# Patient Record
Sex: FEMALE | Race: WHITE | NOT HISPANIC OR LATINO | URBAN - METROPOLITAN AREA
[De-identification: names, ages, dates, MRNs, and addresses within clinical notes are randomized per-mention and may not be internally consistent; named-entity substitution may affect disease eponyms.]

---

## 2017-01-09 ENCOUNTER — OUTPATIENT (OUTPATIENT)
Dept: OUTPATIENT SERVICES | Facility: HOSPITAL | Age: 72
LOS: 1 days | End: 2017-01-09
Payer: MEDICARE

## 2017-01-09 DIAGNOSIS — Z51.89 ENCOUNTER FOR OTHER SPECIFIED AFTERCARE: ICD-10-CM

## 2017-01-09 DIAGNOSIS — M48.06 SPINAL STENOSIS, LUMBAR REGION: ICD-10-CM

## 2017-03-29 PROCEDURE — G8980: CPT | Mod: CK

## 2017-03-29 PROCEDURE — G8979: CPT | Mod: CK

## 2017-03-29 PROCEDURE — 97010 HOT OR COLD PACKS THERAPY: CPT

## 2017-03-29 PROCEDURE — 97140 MANUAL THERAPY 1/> REGIONS: CPT | Mod: KX

## 2017-03-29 PROCEDURE — 97163 PT EVAL HIGH COMPLEX 45 MIN: CPT

## 2017-03-29 PROCEDURE — G8978: CPT | Mod: CK

## 2017-03-29 PROCEDURE — 97110 THERAPEUTIC EXERCISES: CPT | Mod: KX

## 2017-04-02 ENCOUNTER — TRANSCRIPTION ENCOUNTER (OUTPATIENT)
Age: 72
End: 2017-04-02

## 2017-04-07 ENCOUNTER — INPATIENT (INPATIENT)
Facility: HOSPITAL | Age: 72
LOS: 5 days | Discharge: HOME | DRG: 560 | End: 2017-04-13
Attending: PHYSICAL MEDICINE & REHABILITATION | Admitting: SPECIALIST
Payer: MEDICARE

## 2017-04-07 VITALS
HEART RATE: 76 BPM | OXYGEN SATURATION: 97 % | TEMPERATURE: 99 F | WEIGHT: 212.53 LBS | DIASTOLIC BLOOD PRESSURE: 65 MMHG | RESPIRATION RATE: 18 BRPM | SYSTOLIC BLOOD PRESSURE: 144 MMHG

## 2017-04-07 DIAGNOSIS — Z51.89 ENCOUNTER FOR OTHER SPECIFIED AFTERCARE: ICD-10-CM

## 2017-04-07 DIAGNOSIS — M48.06 SPINAL STENOSIS, LUMBAR REGION: ICD-10-CM

## 2017-04-07 PROCEDURE — 99222 1ST HOSP IP/OBS MODERATE 55: CPT | Mod: AI,GC

## 2017-04-07 RX ORDER — SERTRALINE 25 MG/1
25 TABLET, FILM COATED ORAL DAILY
Qty: 0 | Refills: 0 | Status: DISCONTINUED | OUTPATIENT
Start: 2017-04-07 | End: 2017-04-13

## 2017-04-07 RX ORDER — ACETAMINOPHEN 500 MG
650 TABLET ORAL EVERY 6 HOURS
Qty: 0 | Refills: 0 | Status: DISCONTINUED | OUTPATIENT
Start: 2017-04-07 | End: 2017-04-13

## 2017-04-07 RX ORDER — FLUTICASONE PROPIONATE 50 MCG
2 SPRAY, SUSPENSION NASAL
Qty: 0 | Refills: 0 | Status: DISCONTINUED | OUTPATIENT
Start: 2017-04-07 | End: 2017-04-13

## 2017-04-07 RX ORDER — ALLOPURINOL 300 MG
100 TABLET ORAL DAILY
Qty: 0 | Refills: 0 | Status: DISCONTINUED | OUTPATIENT
Start: 2017-04-07 | End: 2017-04-13

## 2017-04-07 RX ORDER — MAGNESIUM OXIDE 400 MG ORAL TABLET 241.3 MG
400 TABLET ORAL DAILY
Qty: 0 | Refills: 0 | Status: DISCONTINUED | OUTPATIENT
Start: 2017-04-07 | End: 2017-04-13

## 2017-04-07 RX ORDER — FUROSEMIDE 40 MG
20 TABLET ORAL DAILY
Qty: 0 | Refills: 0 | Status: DISCONTINUED | OUTPATIENT
Start: 2017-04-07 | End: 2017-04-13

## 2017-04-07 RX ORDER — ASCORBIC ACID 60 MG
500 TABLET,CHEWABLE ORAL DAILY
Qty: 0 | Refills: 0 | Status: DISCONTINUED | OUTPATIENT
Start: 2017-04-07 | End: 2017-04-13

## 2017-04-07 RX ORDER — TAPENTADOL HYDROCHLORIDE 50 MG/1
100 TABLET, FILM COATED ORAL EVERY 4 HOURS
Qty: 0 | Refills: 0 | Status: DISCONTINUED | OUTPATIENT
Start: 2017-04-07 | End: 2017-04-13

## 2017-04-07 RX ORDER — ASPIRIN/CALCIUM CARB/MAGNESIUM 324 MG
325 TABLET ORAL
Qty: 0 | Refills: 0 | Status: DISCONTINUED | OUTPATIENT
Start: 2017-04-07 | End: 2017-04-13

## 2017-04-07 RX ORDER — SENNA PLUS 8.6 MG/1
2 TABLET ORAL AT BEDTIME
Qty: 0 | Refills: 0 | Status: DISCONTINUED | OUTPATIENT
Start: 2017-04-07 | End: 2017-04-13

## 2017-04-07 RX ORDER — ATORVASTATIN CALCIUM 80 MG/1
10 TABLET, FILM COATED ORAL AT BEDTIME
Qty: 0 | Refills: 0 | Status: DISCONTINUED | OUTPATIENT
Start: 2017-04-07 | End: 2017-04-13

## 2017-04-07 RX ORDER — POTASSIUM CHLORIDE 20 MEQ
10 PACKET (EA) ORAL DAILY
Qty: 0 | Refills: 0 | Status: DISCONTINUED | OUTPATIENT
Start: 2017-04-07 | End: 2017-04-13

## 2017-04-07 RX ORDER — DOCUSATE SODIUM 100 MG
100 CAPSULE ORAL
Qty: 0 | Refills: 0 | Status: DISCONTINUED | OUTPATIENT
Start: 2017-04-07 | End: 2017-04-13

## 2017-04-07 RX ORDER — FERROUS SULFATE 325(65) MG
325 TABLET ORAL
Qty: 0 | Refills: 0 | Status: DISCONTINUED | OUTPATIENT
Start: 2017-04-07 | End: 2017-04-10

## 2017-04-07 RX ORDER — POTASSIUM CHLORIDE 20 MEQ
30 PACKET (EA) ORAL DAILY
Qty: 0 | Refills: 0 | Status: DISCONTINUED | OUTPATIENT
Start: 2017-04-07 | End: 2017-04-07

## 2017-04-07 RX ADMIN — Medication 325 MILLIGRAM(S): at 18:15

## 2017-04-07 RX ADMIN — Medication 100 MILLIGRAM(S): at 18:15

## 2017-04-07 RX ADMIN — Medication 325 MILLIGRAM(S): at 18:14

## 2017-04-07 RX ADMIN — Medication 50 MILLIGRAM(S): at 18:15

## 2017-04-07 RX ADMIN — Medication 2 SPRAY(S): at 22:13

## 2017-04-08 LAB
24R-OH-CALCIDIOL SERPL-MCNC: 34.9 NG/ML — SIGNIFICANT CHANGE UP (ref 30–100)
ANION GAP SERPL CALC-SCNC: 11 MMOL/L — SIGNIFICANT CHANGE UP (ref 5–17)
APPEARANCE UR: ABNORMAL
BACTERIA # UR AUTO: ABNORMAL
BASOPHILS # BLD AUTO: 0 K/UL — SIGNIFICANT CHANGE UP (ref 0–0.2)
BASOPHILS NFR BLD AUTO: 0.3 % — SIGNIFICANT CHANGE UP (ref 0–2)
BILIRUB UR-MCNC: NEGATIVE — SIGNIFICANT CHANGE UP
BUN SERPL-MCNC: 12 MG/DL — SIGNIFICANT CHANGE UP (ref 8–20)
CALCIUM SERPL-MCNC: 8.9 MG/DL — SIGNIFICANT CHANGE UP (ref 8.6–10.2)
CHLORIDE SERPL-SCNC: 100 MMOL/L — SIGNIFICANT CHANGE UP (ref 98–107)
CO2 SERPL-SCNC: 29 MMOL/L — SIGNIFICANT CHANGE UP (ref 22–29)
COLOR SPEC: YELLOW — SIGNIFICANT CHANGE UP
CREAT SERPL-MCNC: 0.44 MG/DL — LOW (ref 0.5–1.3)
DIFF PNL FLD: ABNORMAL
EOSINOPHIL # BLD AUTO: 0.3 K/UL — SIGNIFICANT CHANGE UP (ref 0–0.5)
EOSINOPHIL NFR BLD AUTO: 4.5 % — SIGNIFICANT CHANGE UP (ref 0–6)
EPI CELLS # UR: SIGNIFICANT CHANGE UP
GLUCOSE SERPL-MCNC: 95 MG/DL — SIGNIFICANT CHANGE UP (ref 70–115)
GLUCOSE UR QL: NEGATIVE MG/DL — SIGNIFICANT CHANGE UP
HCT VFR BLD CALC: 31.5 % — LOW (ref 37–47)
HGB BLD-MCNC: 10.4 G/DL — LOW (ref 12–16)
KETONES UR-MCNC: NEGATIVE — SIGNIFICANT CHANGE UP
LEUKOCYTE ESTERASE UR-ACNC: ABNORMAL
LYMPHOCYTES # BLD AUTO: 2.5 K/UL — SIGNIFICANT CHANGE UP (ref 1–4.8)
LYMPHOCYTES # BLD AUTO: 34.4 % — SIGNIFICANT CHANGE UP (ref 20–55)
MAGNESIUM SERPL-MCNC: 1.9 MG/DL — SIGNIFICANT CHANGE UP (ref 1.8–2.5)
MCHC RBC-ENTMCNC: 30.1 PG — SIGNIFICANT CHANGE UP (ref 27–31)
MCHC RBC-ENTMCNC: 33 G/DL — SIGNIFICANT CHANGE UP (ref 32–36)
MCV RBC AUTO: 91.3 FL — SIGNIFICANT CHANGE UP (ref 81–99)
MONOCYTES # BLD AUTO: 0.9 K/UL — HIGH (ref 0–0.8)
MONOCYTES NFR BLD AUTO: 13.1 % — HIGH (ref 3–10)
NEUTROPHILS # BLD AUTO: 3.4 K/UL — SIGNIFICANT CHANGE UP (ref 1.8–8)
NEUTROPHILS NFR BLD AUTO: 47.6 % — SIGNIFICANT CHANGE UP (ref 37–73)
NITRITE UR-MCNC: NEGATIVE — SIGNIFICANT CHANGE UP
PH UR: 6 — SIGNIFICANT CHANGE UP (ref 4.8–8)
PLATELET # BLD AUTO: 238 K/UL — SIGNIFICANT CHANGE UP (ref 150–400)
POTASSIUM SERPL-MCNC: 3.7 MMOL/L — SIGNIFICANT CHANGE UP (ref 3.5–5.3)
POTASSIUM SERPL-SCNC: 3.7 MMOL/L — SIGNIFICANT CHANGE UP (ref 3.5–5.3)
PROT UR-MCNC: NEGATIVE MG/DL — SIGNIFICANT CHANGE UP
RBC # BLD: 3.45 M/UL — LOW (ref 4.4–5.2)
RBC # FLD: 13.1 % — SIGNIFICANT CHANGE UP (ref 11–15.6)
RBC CASTS # UR COMP ASSIST: ABNORMAL /HPF (ref 0–4)
SODIUM SERPL-SCNC: 140 MMOL/L — SIGNIFICANT CHANGE UP (ref 135–145)
SP GR SPEC: 1.01 — SIGNIFICANT CHANGE UP (ref 1.01–1.02)
UROBILINOGEN FLD QL: NEGATIVE MG/DL — SIGNIFICANT CHANGE UP
WBC # BLD: 7.2 K/UL — SIGNIFICANT CHANGE UP (ref 4.8–10.8)
WBC # FLD AUTO: 7.2 K/UL — SIGNIFICANT CHANGE UP (ref 4.8–10.8)
WBC UR QL: ABNORMAL

## 2017-04-08 PROCEDURE — 99232 SBSQ HOSP IP/OBS MODERATE 35: CPT

## 2017-04-08 RX ORDER — LACTOBACILLUS ACIDOPHILUS 100MM CELL
1 CAPSULE ORAL
Qty: 0 | Refills: 0 | Status: DISCONTINUED | OUTPATIENT
Start: 2017-04-08 | End: 2017-04-13

## 2017-04-08 RX ORDER — CIPROFLOXACIN LACTATE 400MG/40ML
250 VIAL (ML) INTRAVENOUS EVERY 12 HOURS
Qty: 0 | Refills: 0 | Status: COMPLETED | OUTPATIENT
Start: 2017-04-08 | End: 2017-04-12

## 2017-04-08 RX ORDER — CIPROFLOXACIN LACTATE 400MG/40ML
250 VIAL (ML) INTRAVENOUS ONCE
Qty: 0 | Refills: 0 | Status: COMPLETED | OUTPATIENT
Start: 2017-04-08 | End: 2017-04-08

## 2017-04-08 RX ORDER — CIPROFLOXACIN LACTATE 400MG/40ML
250 VIAL (ML) INTRAVENOUS EVERY 12 HOURS
Qty: 0 | Refills: 0 | Status: DISCONTINUED | OUTPATIENT
Start: 2017-04-08 | End: 2017-04-08

## 2017-04-08 RX ADMIN — SERTRALINE 25 MILLIGRAM(S): 25 TABLET, FILM COATED ORAL at 12:00

## 2017-04-08 RX ADMIN — Medication 100 MILLIGRAM(S): at 06:16

## 2017-04-08 RX ADMIN — Medication 250 MILLIGRAM(S): at 06:16

## 2017-04-08 RX ADMIN — Medication 1 TABLET(S): at 11:59

## 2017-04-08 RX ADMIN — Medication 250 MILLIGRAM(S): at 17:30

## 2017-04-08 RX ADMIN — Medication 325 MILLIGRAM(S): at 17:30

## 2017-04-08 RX ADMIN — Medication 10 MILLIEQUIVALENT(S): at 11:59

## 2017-04-08 RX ADMIN — Medication 2 SPRAY(S): at 06:17

## 2017-04-08 RX ADMIN — Medication 50 MILLIGRAM(S): at 06:17

## 2017-04-08 RX ADMIN — Medication 20 MILLIGRAM(S): at 06:17

## 2017-04-08 RX ADMIN — Medication 325 MILLIGRAM(S): at 08:58

## 2017-04-08 RX ADMIN — Medication 1 TABLET(S): at 17:30

## 2017-04-08 RX ADMIN — MAGNESIUM OXIDE 400 MG ORAL TABLET 400 MILLIGRAM(S): 241.3 TABLET ORAL at 11:59

## 2017-04-08 RX ADMIN — TAPENTADOL HYDROCHLORIDE 100 MILLIGRAM(S): 50 TABLET, FILM COATED ORAL at 22:57

## 2017-04-08 RX ADMIN — Medication 650 MILLIGRAM(S): at 14:24

## 2017-04-08 RX ADMIN — TAPENTADOL HYDROCHLORIDE 100 MILLIGRAM(S): 50 TABLET, FILM COATED ORAL at 08:58

## 2017-04-08 RX ADMIN — Medication 500 MILLIGRAM(S): at 11:59

## 2017-04-08 RX ADMIN — TAPENTADOL HYDROCHLORIDE 100 MILLIGRAM(S): 50 TABLET, FILM COATED ORAL at 16:28

## 2017-04-08 RX ADMIN — Medication 650 MILLIGRAM(S): at 13:47

## 2017-04-08 RX ADMIN — Medication 325 MILLIGRAM(S): at 11:59

## 2017-04-08 RX ADMIN — Medication 100 MILLIGRAM(S): at 12:00

## 2017-04-08 RX ADMIN — Medication 325 MILLIGRAM(S): at 06:16

## 2017-04-08 RX ADMIN — Medication 50 MILLIGRAM(S): at 17:32

## 2017-04-08 RX ADMIN — TAPENTADOL HYDROCHLORIDE 100 MILLIGRAM(S): 50 TABLET, FILM COATED ORAL at 00:54

## 2017-04-08 NOTE — PROGRESS NOTE ADULT - SUBJECTIVE AND OBJECTIVE BOX
No overnight events. Slept ok, but was interrupted a few times.   Pain in the left hip is controlled with medication. Swelling is stable.  Had BM. No other issues reported.         REVIEW OF SYSTEMS  Constitutional - No fever, No weight loss, No fatigue  HEENT - No eye pain, No visual disturbances, No difficulty hearing, No tinnitus, No vertigo, No neck pain  Neurological - No headaches, No memory loss, +loss of strength, No numbness, No tremors  Skin - No itching, No rashes, No lesions   Musculoskeletal - +o joint pain, +joint swelling, +muscle pain  Psychiatric - No depression, No anxiety    RECENT LABS/IMAGING  CBC Full  -  ( 2017 05:07 )  WBC Count : 7.2 K/uL  Hemoglobin : 10.4 g/dL  Hematocrit : 31.5 %  Platelet Count - Automated : 238 K/uL  Mean Cell Volume : 91.3 fl  Mean Cell Hemoglobin : 30.1 pg  Mean Cell Hemoglobin Concentration : 33.0 g/dL  Auto Neutrophil # : 3.4 K/uL  Auto Lymphocyte # : 2.5 K/uL  Auto Monocyte # : 0.9 K/uL  Auto Eosinophil # : 0.3 K/uL  Auto Basophil # : 0.0 K/uL  Auto Neutrophil % : 47.6 %  Auto Lymphocyte % : 34.4 %  Auto Monocyte % : 13.1 %  Auto Eosinophil % : 4.5 %  Auto Basophil % : 0.3 %    08    140  |  100  |  12.0  ----------------------------<  95  3.7   |  29.0  |  0.44<L>    Ca    8.9      2017 05:07  Mg     1.9     -08      Urinalysis Basic - ( 2017 01:40 )    Color: Yellow / Appearance: very cloudy / S.010 / pH: x  Gluc: x / Ketone: Negative  / Bili: Negative / Urobili: Negative mg/dL   Blood: x / Protein: Negative mg/dL / Nitrite: Negative   Leuk Esterase: Moderate / RBC: 3-5 /HPF / WBC 11-25   Sq Epi: x / Non Sq Epi: Occasional / Bacteria: Moderate        VITALS  T(C): 36.9, Max: 37.1 (07 @ 17:31)  HR: 70 (70 - 77)  BP: 138/70 (119/67 - 144/65)  RR: 18 (18 - 18)  SpO2: 95% (95% - 97%)  Wt(kg): --    MEDICATIONS   allopurinol 100milliGRAM(s) daily  ascorbic acid 500milliGRAM(s) daily  aspirin 325milliGRAM(s) two times a day  atorvastatin 10milliGRAM(s) at bedtime  docusate sodium 100milliGRAM(s) two times a day  ferrous    sulfate 325milliGRAM(s) three times a day with meals  fluticasone propionate 50 MICROgram(s)/spray Nasal Spray 2Spray(s) two times a day  furosemide    Tablet 20milliGRAM(s) daily  pregabalin 50milliGRAM(s) two times a day  sertraline 25milliGRAM(s) daily  magnesium oxide 400milliGRAM(s) daily  acetaminophen   Tablet 650milliGRAM(s) every 6 hours PRN  multivitamin 1Tablet(s) daily  acetaminophen   Tablet. 650milliGRAM(s) every 6 hours PRN  potassium chloride    Tablet ER 10milliEquivalent(s) daily  senna 2Tablet(s) at bedtime  tapentadol 100milliGRAM(s) every 6 hours PRN      --------------------------------------------------------------------  PHYSICAL EXAM  Constitutional - NAD, Comfortable  Chest - CTA bilaterally, No wheeze, No rhonchi, No crackles  Cardiovascular - RRR, S1S2, No murmurs  Abdomen - BS+, Soft, NTND  Extremities - No C/C/E, No calf tenderness   Neurologic Exam -                    Cognitive - Awake, Alert, AAO to self, place, date, year, situation     Communication - Fluent, No dysarthria     Motor - No focal deficits                    LEFT    UE - ShAB 5/5, EF 5/5, EE 5/5, WE 5/5,  5/5                    RIGHT UE - ShAB 5/5, EF 5/5, EE 5/5, WE 5/5,  5/5                    LEFT    LE - HF -/5, KE -/5, DF 5/5, PF 5/5                    RIGHT LE - HF 5/5, KE 5/5, DF 5/5, PF 5/5        Sensory - Intact to LT   Psychiatric - Mood stable, Affect WNL  ----------------------------------------------------------------------------------------  ASSESSMENT/PLAN  71F s/p left MARIANO now with functional deficits  HTN - Lasix + KCl  Mood - Zoloft  ENT - Flonase  CAD - Lipitor  Gout - Allopurinol  GI/Bowel - Colace, Senna  Pain - Tylenol PRN, Lyrica, Nucynta  DVT PPX - ASA  Diet - Regular/Thins, Vit C, Iron, MgOx, MVI    Continue comprehensive program of 3hrs/day consisting of OT, PT.

## 2017-04-08 NOTE — PHARMACY COMMUNICATION NOTE - COMMENTS
Spoke with MD regarding no SrCr so can't calculate proper dosing.  MD said there was a lab value from 4/6/17 from a different hospital of 0.63.  Going to give one dose now and suspend until BMP comes back in the morning.

## 2017-04-09 PROCEDURE — 99232 SBSQ HOSP IP/OBS MODERATE 35: CPT

## 2017-04-09 RX ADMIN — Medication 325 MILLIGRAM(S): at 17:35

## 2017-04-09 RX ADMIN — Medication 325 MILLIGRAM(S): at 12:05

## 2017-04-09 RX ADMIN — TAPENTADOL HYDROCHLORIDE 100 MILLIGRAM(S): 50 TABLET, FILM COATED ORAL at 19:27

## 2017-04-09 RX ADMIN — TAPENTADOL HYDROCHLORIDE 100 MILLIGRAM(S): 50 TABLET, FILM COATED ORAL at 06:54

## 2017-04-09 RX ADMIN — Medication 100 MILLIGRAM(S): at 17:36

## 2017-04-09 RX ADMIN — SERTRALINE 25 MILLIGRAM(S): 25 TABLET, FILM COATED ORAL at 12:05

## 2017-04-09 RX ADMIN — Medication 1 TABLET(S): at 17:39

## 2017-04-09 RX ADMIN — Medication 10 MILLIEQUIVALENT(S): at 12:05

## 2017-04-09 RX ADMIN — Medication 325 MILLIGRAM(S): at 17:36

## 2017-04-09 RX ADMIN — Medication 100 MILLIGRAM(S): at 06:03

## 2017-04-09 RX ADMIN — Medication 1 TABLET(S): at 12:05

## 2017-04-09 RX ADMIN — Medication 250 MILLIGRAM(S): at 06:03

## 2017-04-09 RX ADMIN — Medication 650 MILLIGRAM(S): at 07:00

## 2017-04-09 RX ADMIN — Medication 20 MILLIGRAM(S): at 06:03

## 2017-04-09 RX ADMIN — Medication 325 MILLIGRAM(S): at 08:29

## 2017-04-09 RX ADMIN — TAPENTADOL HYDROCHLORIDE 100 MILLIGRAM(S): 50 TABLET, FILM COATED ORAL at 08:31

## 2017-04-09 RX ADMIN — TAPENTADOL HYDROCHLORIDE 100 MILLIGRAM(S): 50 TABLET, FILM COATED ORAL at 16:00

## 2017-04-09 RX ADMIN — Medication 250 MILLIGRAM(S): at 17:35

## 2017-04-09 RX ADMIN — Medication 500 MILLIGRAM(S): at 12:04

## 2017-04-09 RX ADMIN — Medication 50 MILLIGRAM(S): at 17:35

## 2017-04-09 RX ADMIN — MAGNESIUM OXIDE 400 MG ORAL TABLET 400 MILLIGRAM(S): 241.3 TABLET ORAL at 12:04

## 2017-04-09 RX ADMIN — Medication 650 MILLIGRAM(S): at 14:36

## 2017-04-09 RX ADMIN — TAPENTADOL HYDROCHLORIDE 100 MILLIGRAM(S): 50 TABLET, FILM COATED ORAL at 22:00

## 2017-04-09 RX ADMIN — Medication 100 MILLIGRAM(S): at 12:04

## 2017-04-09 RX ADMIN — Medication 650 MILLIGRAM(S): at 06:03

## 2017-04-09 RX ADMIN — Medication 50 MILLIGRAM(S): at 06:03

## 2017-04-09 RX ADMIN — Medication 325 MILLIGRAM(S): at 06:03

## 2017-04-09 RX ADMIN — TAPENTADOL HYDROCHLORIDE 100 MILLIGRAM(S): 50 TABLET, FILM COATED ORAL at 15:03

## 2017-04-09 RX ADMIN — Medication 650 MILLIGRAM(S): at 15:00

## 2017-04-09 RX ADMIN — Medication 1 TABLET(S): at 09:33

## 2017-04-09 NOTE — PROGRESS NOTE ADULT - SUBJECTIVE AND OBJECTIVE BOX
Patient had therapy and walked a lot. She feels her left leg is doing well in terms of pain management. Also requesting her Nucynta to be given every 4 hrs. Will change. Had BM yesterday.     REVIEW OF SYSTEMS  Constitutional - No fever, No weight loss, No fatigue  HEENT - No eye pain, No visual disturbances, No difficulty hearing, No tinnitus, No vertigo, No neck pain  Neurological - No headaches, No memory loss, +loss of strength, No numbness, No tremors  Skin - No itching, No rashes, No lesions   Musculoskeletal - +joint pain, +joint swelling, +muscle pain  Psychiatric - No depression, No anxiety    RECENT LABS/IMAGING  CBC Full  -  ( 2017 05:07 )  WBC Count : 7.2 K/uL  Hemoglobin : 10.4 g/dL  Hematocrit : 31.5 %  Platelet Count - Automated : 238 K/uL  Mean Cell Volume : 91.3 fl  Mean Cell Hemoglobin : 30.1 pg  Mean Cell Hemoglobin Concentration : 33.0 g/dL  Auto Neutrophil # : 3.4 K/uL  Auto Lymphocyte # : 2.5 K/uL  Auto Monocyte # : 0.9 K/uL  Auto Eosinophil # : 0.3 K/uL  Auto Basophil # : 0.0 K/uL  Auto Neutrophil % : 47.6 %  Auto Lymphocyte % : 34.4 %  Auto Monocyte % : 13.1 %  Auto Eosinophil % : 4.5 %  Auto Basophil % : 0.3 %    08    140  |  100  |  12.0  ----------------------------<  95  3.7   |  29.0  |  0.44<L>    Ca    8.9      2017 05:07  Mg     1.9     04-08      Urinalysis Basic - ( 2017 01:40 )    Color: Yellow / Appearance: very cloudy / S.010 / pH: x  Gluc: x / Ketone: Negative  / Bili: Negative / Urobili: Negative mg/dL   Blood: x / Protein: Negative mg/dL / Nitrite: Negative   Leuk Esterase: Moderate / RBC: 3-5 /HPF / WBC 11-25   Sq Epi: x / Non Sq Epi: Occasional / Bacteria: Moderate        VITALS  T(C): 36.9  T(F): 98.5, Max: 98.5 (04-09 @ 05:54)  HR: 75 (68 - 76)  BP: 160/80 (130/60 - 160/80)  RR:  (16 - 16)  SpO2:  (94% - 98%)  Wt(kg): --    MEDICATIONS   allopurinol 100milliGRAM(s) daily  ascorbic acid 500milliGRAM(s) daily  aspirin 325milliGRAM(s) two times a day  atorvastatin 10milliGRAM(s) at bedtime  docusate sodium 100milliGRAM(s) two times a day  ferrous    sulfate 325milliGRAM(s) three times a day with meals  fluticasone propionate 50 MICROgram(s)/spray Nasal Spray 2Spray(s) two times a day  furosemide    Tablet 20milliGRAM(s) daily  pregabalin 50milliGRAM(s) two times a day  sertraline 25milliGRAM(s) daily  magnesium oxide 400milliGRAM(s) daily  acetaminophen   Tablet 650milliGRAM(s) every 6 hours PRN  multivitamin 1Tablet(s) daily  acetaminophen   Tablet. 650milliGRAM(s) every 6 hours PRN  potassium chloride    Tablet ER 10milliEquivalent(s) daily  senna 2Tablet(s) at bedtime  tapentadol 100milliGRAM(s) every 6 hours PRN      --------------------------------------------------------------------  PHYSICAL EXAM  Constitutional - NAD, Comfortable  Chest - CTA bilaterally, No wheeze, No rhonchi, No crackles  Cardiovascular - RRR, S1S2, No murmurs  Abdomen - BS+, Soft, NTND  Extremities - No C/C/E, No calf tenderness   Neurologic Exam -                    Cognitive - Awake, Alert, AAO to self, place, date, year, situation     Communication - Fluent, No dysarthria     Motor - No focal deficits                    LEFT    UE - ShAB 5/5, EF 5/5, EE 5/5, WE 5/5,  5/5                    RIGHT UE - ShAB 5/5, EF 5/5, EE 5/5, WE 5/5,  5/5                    LEFT    LE - HF -/5, KE -/5, DF 5/5, PF 5/5                    RIGHT LE - HF 5/5, KE 5/5, DF 5/5, PF 5/        Sensory - Intact to LT   Psychiatric - Mood stable, Affect WNL  ----------------------------------------------------------------------------------------  ASSESSMENT/PLAN  71F s/p left MARIANO now with functional deficits  HTN - Lasix + KCl  Mood - Zoloft  ENT - Flonase  CAD - Lipitor  Gout - Allopurinol  GI/Bowel - Colace, Senna  UTI - Cipro & Florastor x5 days (-)  Pain - Tylenol PRN, Lyrica, Nucynta PRN  DVT PPX - ASA  Diet - Regular/Thins, Vit C, Iron, MgOx, MVI    Continue comprehensive program of 3hrs/day consisting of OT & PT.

## 2017-04-10 LAB
-  AMIKACIN: SIGNIFICANT CHANGE UP
-  AMPICILLIN/SULBACTAM: SIGNIFICANT CHANGE UP
-  AMPICILLIN: SIGNIFICANT CHANGE UP
-  AZTREONAM: SIGNIFICANT CHANGE UP
-  CEFAZOLIN: SIGNIFICANT CHANGE UP
-  CEFEPIME: SIGNIFICANT CHANGE UP
-  CEFOXITIN: SIGNIFICANT CHANGE UP
-  CEFTAZIDIME: SIGNIFICANT CHANGE UP
-  CEFTRIAXONE: SIGNIFICANT CHANGE UP
-  CIPROFLOXACIN: SIGNIFICANT CHANGE UP
-  ERTAPENEM: SIGNIFICANT CHANGE UP
-  GENTAMICIN: SIGNIFICANT CHANGE UP
-  IMIPENEM: SIGNIFICANT CHANGE UP
-  LEVOFLOXACIN: SIGNIFICANT CHANGE UP
-  MEROPENEM: SIGNIFICANT CHANGE UP
-  NITROFURANTOIN: SIGNIFICANT CHANGE UP
-  PIPERACILLIN/TAZOBACTAM: SIGNIFICANT CHANGE UP
-  TOBRAMYCIN: SIGNIFICANT CHANGE UP
-  TRIMETHOPRIM/SULFAMETHOXAZOLE: SIGNIFICANT CHANGE UP
ALBUMIN SERPL ELPH-MCNC: 3.3 G/DL — SIGNIFICANT CHANGE UP (ref 3.3–5.2)
ALP SERPL-CCNC: 82 U/L — SIGNIFICANT CHANGE UP (ref 40–120)
ALT FLD-CCNC: 22 U/L — SIGNIFICANT CHANGE UP
ANION GAP SERPL CALC-SCNC: 10 MMOL/L — SIGNIFICANT CHANGE UP (ref 5–17)
AST SERPL-CCNC: 17 U/L — SIGNIFICANT CHANGE UP
BASOPHILS # BLD AUTO: 0 K/UL — SIGNIFICANT CHANGE UP (ref 0–0.2)
BASOPHILS NFR BLD AUTO: 0.4 % — SIGNIFICANT CHANGE UP (ref 0–2)
BILIRUB SERPL-MCNC: 0.4 MG/DL — SIGNIFICANT CHANGE UP (ref 0.4–2)
BUN SERPL-MCNC: 14 MG/DL — SIGNIFICANT CHANGE UP (ref 8–20)
CALCIUM SERPL-MCNC: 9.3 MG/DL — SIGNIFICANT CHANGE UP (ref 8.6–10.2)
CHLORIDE SERPL-SCNC: 101 MMOL/L — SIGNIFICANT CHANGE UP (ref 98–107)
CO2 SERPL-SCNC: 32 MMOL/L — HIGH (ref 22–29)
CREAT SERPL-MCNC: 0.47 MG/DL — LOW (ref 0.5–1.3)
EOSINOPHIL # BLD AUTO: 0.3 K/UL — SIGNIFICANT CHANGE UP (ref 0–0.5)
EOSINOPHIL NFR BLD AUTO: 5 % — SIGNIFICANT CHANGE UP (ref 0–6)
GLUCOSE SERPL-MCNC: 95 MG/DL — SIGNIFICANT CHANGE UP (ref 70–115)
HCT VFR BLD CALC: 34 % — LOW (ref 37–47)
HGB BLD-MCNC: 11.1 G/DL — LOW (ref 12–16)
LYMPHOCYTES # BLD AUTO: 2.6 K/UL — SIGNIFICANT CHANGE UP (ref 1–4.8)
LYMPHOCYTES # BLD AUTO: 38.8 % — SIGNIFICANT CHANGE UP (ref 20–55)
MCHC RBC-ENTMCNC: 30.1 PG — SIGNIFICANT CHANGE UP (ref 27–31)
MCHC RBC-ENTMCNC: 32.6 G/DL — SIGNIFICANT CHANGE UP (ref 32–36)
MCV RBC AUTO: 92.1 FL — SIGNIFICANT CHANGE UP (ref 81–99)
METHOD TYPE: SIGNIFICANT CHANGE UP
MONOCYTES # BLD AUTO: 0.7 K/UL — SIGNIFICANT CHANGE UP (ref 0–0.8)
MONOCYTES NFR BLD AUTO: 10.9 % — HIGH (ref 3–10)
NEUTROPHILS # BLD AUTO: 3 K/UL — SIGNIFICANT CHANGE UP (ref 1.8–8)
NEUTROPHILS NFR BLD AUTO: 44.6 % — SIGNIFICANT CHANGE UP (ref 37–73)
PLATELET # BLD AUTO: 315 K/UL — SIGNIFICANT CHANGE UP (ref 150–400)
POTASSIUM SERPL-MCNC: 4.2 MMOL/L — SIGNIFICANT CHANGE UP (ref 3.5–5.3)
POTASSIUM SERPL-SCNC: 4.2 MMOL/L — SIGNIFICANT CHANGE UP (ref 3.5–5.3)
PROT SERPL-MCNC: 6.8 G/DL — SIGNIFICANT CHANGE UP (ref 6.6–8.7)
RBC # BLD: 3.69 M/UL — LOW (ref 4.4–5.2)
RBC # FLD: 13.5 % — SIGNIFICANT CHANGE UP (ref 11–15.6)
SODIUM SERPL-SCNC: 143 MMOL/L — SIGNIFICANT CHANGE UP (ref 135–145)
WBC # BLD: 6.8 K/UL — SIGNIFICANT CHANGE UP (ref 4.8–10.8)
WBC # FLD AUTO: 6.8 K/UL — SIGNIFICANT CHANGE UP (ref 4.8–10.8)

## 2017-04-10 PROCEDURE — 99232 SBSQ HOSP IP/OBS MODERATE 35: CPT

## 2017-04-10 RX ORDER — FERROUS SULFATE 325(65) MG
325 TABLET ORAL DAILY
Qty: 0 | Refills: 0 | Status: DISCONTINUED | OUTPATIENT
Start: 2017-04-10 | End: 2017-04-13

## 2017-04-10 RX ADMIN — SERTRALINE 25 MILLIGRAM(S): 25 TABLET, FILM COATED ORAL at 11:42

## 2017-04-10 RX ADMIN — Medication 1 TABLET(S): at 09:09

## 2017-04-10 RX ADMIN — Medication 325 MILLIGRAM(S): at 06:19

## 2017-04-10 RX ADMIN — Medication 250 MILLIGRAM(S): at 06:13

## 2017-04-10 RX ADMIN — Medication 500 MILLIGRAM(S): at 11:42

## 2017-04-10 RX ADMIN — Medication 650 MILLIGRAM(S): at 06:14

## 2017-04-10 RX ADMIN — TAPENTADOL HYDROCHLORIDE 100 MILLIGRAM(S): 50 TABLET, FILM COATED ORAL at 19:08

## 2017-04-10 RX ADMIN — Medication 100 MILLIGRAM(S): at 11:42

## 2017-04-10 RX ADMIN — Medication 1 TABLET(S): at 17:23

## 2017-04-10 RX ADMIN — Medication 50 MILLIGRAM(S): at 06:19

## 2017-04-10 RX ADMIN — TAPENTADOL HYDROCHLORIDE 100 MILLIGRAM(S): 50 TABLET, FILM COATED ORAL at 23:44

## 2017-04-10 RX ADMIN — Medication 650 MILLIGRAM(S): at 16:10

## 2017-04-10 RX ADMIN — TAPENTADOL HYDROCHLORIDE 100 MILLIGRAM(S): 50 TABLET, FILM COATED ORAL at 04:23

## 2017-04-10 RX ADMIN — Medication 10 MILLIEQUIVALENT(S): at 11:42

## 2017-04-10 RX ADMIN — Medication 50 MILLIGRAM(S): at 17:23

## 2017-04-10 RX ADMIN — TAPENTADOL HYDROCHLORIDE 100 MILLIGRAM(S): 50 TABLET, FILM COATED ORAL at 00:01

## 2017-04-10 RX ADMIN — Medication 325 MILLIGRAM(S): at 08:07

## 2017-04-10 RX ADMIN — TAPENTADOL HYDROCHLORIDE 100 MILLIGRAM(S): 50 TABLET, FILM COATED ORAL at 08:05

## 2017-04-10 RX ADMIN — Medication 100 MILLIGRAM(S): at 17:23

## 2017-04-10 RX ADMIN — Medication 325 MILLIGRAM(S): at 17:23

## 2017-04-10 RX ADMIN — Medication 2 SPRAY(S): at 21:55

## 2017-04-10 RX ADMIN — TAPENTADOL HYDROCHLORIDE 100 MILLIGRAM(S): 50 TABLET, FILM COATED ORAL at 13:48

## 2017-04-10 RX ADMIN — Medication 20 MILLIGRAM(S): at 06:13

## 2017-04-10 RX ADMIN — Medication 100 MILLIGRAM(S): at 06:13

## 2017-04-10 RX ADMIN — Medication 1 TABLET(S): at 11:42

## 2017-04-10 RX ADMIN — Medication 250 MILLIGRAM(S): at 17:23

## 2017-04-10 RX ADMIN — MAGNESIUM OXIDE 400 MG ORAL TABLET 400 MILLIGRAM(S): 241.3 TABLET ORAL at 11:42

## 2017-04-10 RX ADMIN — TAPENTADOL HYDROCHLORIDE 100 MILLIGRAM(S): 50 TABLET, FILM COATED ORAL at 20:10

## 2017-04-10 NOTE — PROGRESS NOTE ADULT - SUBJECTIVE AND OBJECTIVE BOX
Patient seen and examined.  Pt c/o intermittent pain left hip relieved with Nucynta.  Denied bowel or bladder complaints      RECENT LABS/IMAGING                        11.1   6.8   )-----------( 315      ( 10 Apr 2017 06:57 )             34.0   04-10    143  |  101  |  14.0  ----------------------------<  95  4.2   |  32.0<H>  |  0.47<L>    Ca    9.3      10 Apr 2017 06:57    TPro  6.8  /  Alb  3.3  /  TBili  0.4  /  DBili  x   /  AST  17  /  ALT  22  /  AlkPhos  82  04-10        Urinalysis Basic - ( 2017 01:40 )    Color: Yellow / Appearance: very cloudy / S.010 / pH: x  Gluc: x / Ketone: Negative  / Bili: Negative / Urobili: Negative mg/dL   Blood: x / Protein: Negative mg/dL / Nitrite: Negative   Leuk Esterase: Moderate / RBC: 3-5 /HPF / WBC 11-25   Sq Epi: x / Non Sq Epi: Occasional / Bacteria: Moderate        VITALS  T(C): 36.9  T(F): 98.5, Max: 98.5 (-09 @ 05:54)  HR: 75 (68 - 76)  BP: 160/80 (130/60 - 160/80)  RR:  (16 - 16)  SpO2:  (94% - 98%)  Wt(kg): --    MEDICATIONS   allopurinol 100milliGRAM(s) daily  ascorbic acid 500milliGRAM(s) daily  aspirin 325milliGRAM(s) two times a day  atorvastatin 10milliGRAM(s) at bedtime  docusate sodium 100milliGRAM(s) two times a day  ferrous    sulfate 325milliGRAM(s) three times a day with meals  fluticasone propionate 50 MICROgram(s)/spray Nasal Spray 2Spray(s) two times a day  furosemide    Tablet 20milliGRAM(s) daily  pregabalin 50milliGRAM(s) two times a day  sertraline 25milliGRAM(s) daily  magnesium oxide 400milliGRAM(s) daily  acetaminophen   Tablet 650milliGRAM(s) every 6 hours PRN  multivitamin 1Tablet(s) daily  acetaminophen   Tablet. 650milliGRAM(s) every 6 hours PRN  potassium chloride    Tablet ER 10milliEquivalent(s) daily  senna 2Tablet(s) at bedtime  tapentadol 100milliGRAM(s) every 6 hours PRN      --------------------------------------------------------------------  PHYSICAL EXAM  Constitutional - NAD, Comfortable  Chest - CTA bilaterally, No wheeze, No rhonchi, No crackles  Cardiovascular - RRR, S1S2, No murmurs  Abdomen - BS+, Soft, NTND  Extremities - No C/C/E, No calf tenderness   Neurologic Exam -                    Cognitive - Awake, Alert, AAO to self, place, date, year, situation     Communication - Fluent, No dysarthria     Motor - No focal deficits.  Motor 5/5 with exception of left hip N/T         Sensory - Intact to LT   Psychiatric - Mood stable, Affect WNL    Functional status:  Transfers: supervision  Gait: Pt ambulated 60' with RW min assist  ADLS: Bathing and LE dress min assist, UE dress and grooming sup  Functional training: Bed mobility, toilet and bed transfers min assist  ----------------------------------------------------------------------------------------  ASSESSMENT/PLAN  71F s/p left hip OA S/P MARIANO Dr Ortiz now with functional deficits    Comprehensive rehab FWB LLE with post dislocation precautions and abduction pillow at rest and bedtime  Aquacel ressing placed 4/3/17: Remove 7 days from placement date  Staples to be removed in office post op day 10-14  HTN - Lasix + KCl  Mood - Zoloft  ENT - Flonase  CAD - Lipitor  Gout - Allopurinol  GI/Bowel - Colace, Senna  UTI - Cipro & Florastor x5 days (-)  Pain/Neuropathy - Tylenol PRN, Lyrica, Nucynta PRN  DVT PPX - ASA 325mg bid x 6wks (started 17)  Anemia-Acute blood loss-Cont Fe  Diet - Regular/Thins, Vit C, Iron, MgOx, MVI    Continue comprehensive program of 3hrs/day consisting of OT & PT.

## 2017-04-11 LAB
CULTURE RESULTS: SIGNIFICANT CHANGE UP
ORGANISM # SPEC MICROSCOPIC CNT: SIGNIFICANT CHANGE UP
ORGANISM # SPEC MICROSCOPIC CNT: SIGNIFICANT CHANGE UP
SPECIMEN SOURCE: SIGNIFICANT CHANGE UP

## 2017-04-11 PROCEDURE — 99232 SBSQ HOSP IP/OBS MODERATE 35: CPT

## 2017-04-11 RX ADMIN — Medication 100 MILLIGRAM(S): at 17:22

## 2017-04-11 RX ADMIN — TAPENTADOL HYDROCHLORIDE 100 MILLIGRAM(S): 50 TABLET, FILM COATED ORAL at 13:45

## 2017-04-11 RX ADMIN — TAPENTADOL HYDROCHLORIDE 100 MILLIGRAM(S): 50 TABLET, FILM COATED ORAL at 19:20

## 2017-04-11 RX ADMIN — Medication 325 MILLIGRAM(S): at 17:21

## 2017-04-11 RX ADMIN — Medication 1 TABLET(S): at 08:57

## 2017-04-11 RX ADMIN — SERTRALINE 25 MILLIGRAM(S): 25 TABLET, FILM COATED ORAL at 12:25

## 2017-04-11 RX ADMIN — MAGNESIUM OXIDE 400 MG ORAL TABLET 400 MILLIGRAM(S): 241.3 TABLET ORAL at 12:24

## 2017-04-11 RX ADMIN — Medication 325 MILLIGRAM(S): at 12:25

## 2017-04-11 RX ADMIN — TAPENTADOL HYDROCHLORIDE 100 MILLIGRAM(S): 50 TABLET, FILM COATED ORAL at 00:00

## 2017-04-11 RX ADMIN — Medication 325 MILLIGRAM(S): at 06:01

## 2017-04-11 RX ADMIN — Medication 1 TABLET(S): at 17:25

## 2017-04-11 RX ADMIN — Medication 10 MILLIEQUIVALENT(S): at 12:25

## 2017-04-11 RX ADMIN — Medication 650 MILLIGRAM(S): at 16:16

## 2017-04-11 RX ADMIN — Medication 100 MILLIGRAM(S): at 05:59

## 2017-04-11 RX ADMIN — TAPENTADOL HYDROCHLORIDE 100 MILLIGRAM(S): 50 TABLET, FILM COATED ORAL at 00:40

## 2017-04-11 RX ADMIN — Medication 50 MILLIGRAM(S): at 05:59

## 2017-04-11 RX ADMIN — Medication 250 MILLIGRAM(S): at 05:59

## 2017-04-11 RX ADMIN — Medication 100 MILLIGRAM(S): at 12:24

## 2017-04-11 RX ADMIN — Medication 50 MILLIGRAM(S): at 17:21

## 2017-04-11 RX ADMIN — Medication 500 MILLIGRAM(S): at 12:24

## 2017-04-11 RX ADMIN — TAPENTADOL HYDROCHLORIDE 100 MILLIGRAM(S): 50 TABLET, FILM COATED ORAL at 18:24

## 2017-04-11 RX ADMIN — Medication 650 MILLIGRAM(S): at 03:48

## 2017-04-11 RX ADMIN — Medication 650 MILLIGRAM(S): at 04:20

## 2017-04-11 RX ADMIN — Medication 20 MILLIGRAM(S): at 05:59

## 2017-04-11 RX ADMIN — TAPENTADOL HYDROCHLORIDE 100 MILLIGRAM(S): 50 TABLET, FILM COATED ORAL at 23:15

## 2017-04-11 RX ADMIN — Medication 650 MILLIGRAM(S): at 17:00

## 2017-04-11 RX ADMIN — TAPENTADOL HYDROCHLORIDE 100 MILLIGRAM(S): 50 TABLET, FILM COATED ORAL at 01:40

## 2017-04-11 RX ADMIN — Medication 1 TABLET(S): at 12:25

## 2017-04-11 RX ADMIN — TAPENTADOL HYDROCHLORIDE 100 MILLIGRAM(S): 50 TABLET, FILM COATED ORAL at 08:43

## 2017-04-11 RX ADMIN — Medication 250 MILLIGRAM(S): at 17:22

## 2017-04-11 NOTE — PROGRESS NOTE ADULT - SUBJECTIVE AND OBJECTIVE BOX
Patient seen and examined.  Pt c/o intermittent pain left hip relieved with Nucynta.  Denies dysuria, frequency, urgency.  +BM      RECENT LABS/IMAGING                        11.1   6.8   )-----------( 315      ( 10 Apr 2017 06:57 )             34.0   04-10    143  |  101  |  14.0  ----------------------------<  95  4.2   |  32.0<H>  |  0.47<L>    Ca    9.3      10 Apr 2017 06:57    TPro  6.8  /  Alb  3.3  /  TBili  0.4  /  DBili  x   /  AST  17  /  ALT  22  /  AlkPhos  82  04-10        Urinalysis Basic - ( 2017 01:40 )    Color: Yellow / Appearance: very cloudy / S.010 / pH: x  Gluc: x / Ketone: Negative  / Bili: Negative / Urobili: Negative mg/dL   Blood: x / Protein: Negative mg/dL / Nitrite: Negative   Leuk Esterase: Moderate / RBC: 3-5 /HPF / WBC 11-25   Sq Epi: x / Non Sq Epi: Occasional / Bacteria: Moderate        VITALS  T(C): 36.9  T(F): 98.5, Max: 98.5 (-09 @ 05:54)  HR: 75 (68 - 76)  BP: 160/80 (130/60 - 160/80)  RR:  (16 - 16)  SpO2:  (94% - 98%)  Wt(kg): --    MEDICATIONS   allopurinol 100milliGRAM(s) daily  ascorbic acid 500milliGRAM(s) daily  aspirin 325milliGRAM(s) two times a day  atorvastatin 10milliGRAM(s) at bedtime  docusate sodium 100milliGRAM(s) two times a day  ferrous    sulfate 325milliGRAM(s) three times a day with meals  fluticasone propionate 50 MICROgram(s)/spray Nasal Spray 2Spray(s) two times a day  furosemide    Tablet 20milliGRAM(s) daily  pregabalin 50milliGRAM(s) two times a day  sertraline 25milliGRAM(s) daily  magnesium oxide 400milliGRAM(s) daily  acetaminophen   Tablet 650milliGRAM(s) every 6 hours PRN  multivitamin 1Tablet(s) daily  acetaminophen   Tablet. 650milliGRAM(s) every 6 hours PRN  potassium chloride    Tablet ER 10milliEquivalent(s) daily  senna 2Tablet(s) at bedtime  tapentadol 100milliGRAM(s) every 6 hours PRN      --------------------------------------------------------------------  PHYSICAL EXAM  Constitutional - NAD, Comfortable  Chest - CTA bilaterally, No wheeze, No rhonchi, No crackles  Cardiovascular - RRR, S1S2, No murmurs  Abdomen - BS+, Soft, NTND  Extremities - No C/C/E, No calf tenderness   Neurologic Exam -                    Cognitive - Awake, Alert, AAO to self, place, date, year, situation     Communication - Fluent, No dysarthria     Motor - No focal deficits.  Motor 5/5 with exception of left hip N/T         Sensory - Intact to LT   Psychiatric - Mood stable, Affect WNL    Functional status:  Transfers: supervision  Gait: Pt ambulated 60' with RW min assist  ADLS: Bathing and LE dress min assist, UE dress and grooming sup  Functional training: Bed mobility, toilet and bed transfers min assist  ----------------------------------------------------------------------------------------  ASSESSMENT/PLAN  71F s/p left hip OA S/P MARIANO Dr Ortiz now with functional deficits    Comprehensive rehab FWB LLE with post dislocation precautions and abduction pillow at rest and bedtime  Aquacel ressing placed 4/3/17: Remove 7 days from placement date  Staples to be removed in office post op day 10-14  HTN - Lasix + KCl  Mood - Zoloft  ENT - Flonase  CAD - Lipitor  Gout - Allopurinol  GI/Bowel - Colace, Senna  UTI - Cipro & Florastor x5 days (-)  Pain/Neuropathy - Tylenol PRN, Lyrica, Nucynta PRN  DVT PPX - ASA 325mg bid x 6wks (started 17)  Anemia-Acute blood loss-Cont Fe  Diet - Regular/Thins, Vit C, Iron, MgOx, MVI    Continue comprehensive program of 3hrs/day consisting of OT & PT.

## 2017-04-12 PROCEDURE — 99232 SBSQ HOSP IP/OBS MODERATE 35: CPT

## 2017-04-12 RX ADMIN — TAPENTADOL HYDROCHLORIDE 100 MILLIGRAM(S): 50 TABLET, FILM COATED ORAL at 14:16

## 2017-04-12 RX ADMIN — TAPENTADOL HYDROCHLORIDE 100 MILLIGRAM(S): 50 TABLET, FILM COATED ORAL at 08:47

## 2017-04-12 RX ADMIN — Medication 650 MILLIGRAM(S): at 18:00

## 2017-04-12 RX ADMIN — Medication 650 MILLIGRAM(S): at 06:29

## 2017-04-12 RX ADMIN — Medication 650 MILLIGRAM(S): at 17:37

## 2017-04-12 RX ADMIN — Medication 650 MILLIGRAM(S): at 07:18

## 2017-04-12 RX ADMIN — Medication 325 MILLIGRAM(S): at 12:04

## 2017-04-12 RX ADMIN — Medication 20 MILLIGRAM(S): at 05:29

## 2017-04-12 RX ADMIN — Medication 100 MILLIGRAM(S): at 05:29

## 2017-04-12 RX ADMIN — Medication 325 MILLIGRAM(S): at 17:36

## 2017-04-12 RX ADMIN — Medication 100 MILLIGRAM(S): at 12:04

## 2017-04-12 RX ADMIN — Medication 250 MILLIGRAM(S): at 05:29

## 2017-04-12 RX ADMIN — Medication 10 MILLIEQUIVALENT(S): at 12:04

## 2017-04-12 RX ADMIN — Medication 325 MILLIGRAM(S): at 05:29

## 2017-04-12 RX ADMIN — Medication 1 TABLET(S): at 12:04

## 2017-04-12 RX ADMIN — Medication 50 MILLIGRAM(S): at 17:36

## 2017-04-12 RX ADMIN — MAGNESIUM OXIDE 400 MG ORAL TABLET 400 MILLIGRAM(S): 241.3 TABLET ORAL at 12:04

## 2017-04-12 RX ADMIN — Medication 500 MILLIGRAM(S): at 12:04

## 2017-04-12 RX ADMIN — TAPENTADOL HYDROCHLORIDE 100 MILLIGRAM(S): 50 TABLET, FILM COATED ORAL at 19:47

## 2017-04-12 RX ADMIN — TAPENTADOL HYDROCHLORIDE 100 MILLIGRAM(S): 50 TABLET, FILM COATED ORAL at 00:00

## 2017-04-12 RX ADMIN — Medication 1 TABLET(S): at 08:47

## 2017-04-12 RX ADMIN — SERTRALINE 25 MILLIGRAM(S): 25 TABLET, FILM COATED ORAL at 12:04

## 2017-04-12 RX ADMIN — Medication 100 MILLIGRAM(S): at 17:36

## 2017-04-12 RX ADMIN — Medication 50 MILLIGRAM(S): at 05:29

## 2017-04-12 RX ADMIN — TAPENTADOL HYDROCHLORIDE 100 MILLIGRAM(S): 50 TABLET, FILM COATED ORAL at 15:00

## 2017-04-12 RX ADMIN — Medication 1 TABLET(S): at 17:35

## 2017-04-12 RX ADMIN — Medication 250 MILLIGRAM(S): at 17:36

## 2017-04-12 NOTE — PROGRESS NOTE ADULT - SUBJECTIVE AND OBJECTIVE BOX
Pt is a 70y/o female with left hip OA s/p left MARIANO 4/3/17 Dr Ortiz at [facility].      Patient seen and examined.  Pt c/o intermittent pain left hip reaching a peak of 4/10 relieved with Nucynta.  Denies dysuria, frequency, urgency.  +BM      RECENT LABS/IMAGING                        11.1   6.8   )-----------( 315      ( 10 Apr 2017 06:57 )             34.0   04-10    143  |  101  |  14.0  ----------------------------<  95  4.2   |  32.0<H>  |  0.47<L>    Ca    9.3      10 Apr 2017 06:57    TPro  6.8  /  Alb  3.3  /  TBili  0.4  /  DBili  x   /  AST  17  /  ALT  22  /  AlkPhos  82  04-10        Urinalysis Basic - ( 2017 01:40 )    Color: Yellow / Appearance: very cloudy / S.010 / pH: x  Gluc: x / Ketone: Negative  / Bili: Negative / Urobili: Negative mg/dL   Blood: x / Protein: Negative mg/dL / Nitrite: Negative   Leuk Esterase: Moderate / RBC: 3-5 /HPF / WBC 11-25   Sq Epi: x / Non Sq Epi: Occasional / Bacteria: Moderate        VITALS  T(C): 36.9  T(F): 98.5, Max: 98.5 (04-09 @ 05:54)  HR: 75 (68 - 76)  BP: 160/80 (130/60 - 160/80)  RR:  (16 - 16)  SpO2:  (94% - 98%)  Wt(kg): --    MEDICATIONS   allopurinol 100milliGRAM(s) daily  ascorbic acid 500milliGRAM(s) daily  aspirin 325milliGRAM(s) two times a day  atorvastatin 10milliGRAM(s) at bedtime  docusate sodium 100milliGRAM(s) two times a day  ferrous    sulfate 325milliGRAM(s) three times a day with meals  fluticasone propionate 50 MICROgram(s)/spray Nasal Spray 2Spray(s) two times a day  furosemide    Tablet 20milliGRAM(s) daily  pregabalin 50milliGRAM(s) two times a day  sertraline 25milliGRAM(s) daily  magnesium oxide 400milliGRAM(s) daily  acetaminophen   Tablet 650milliGRAM(s) every 6 hours PRN  multivitamin 1Tablet(s) daily  acetaminophen   Tablet. 650milliGRAM(s) every 6 hours PRN  potassium chloride    Tablet ER 10milliEquivalent(s) daily  senna 2Tablet(s) at bedtime  tapentadol 100milliGRAM(s) every 6 hours PRN      --------------------------------------------------------------------  PHYSICAL EXAM  Constitutional - NAD, Comfortable  Chest - CTA bilaterally, No wheeze, No rhonchi, No crackles  Cardiovascular - RRR, S1S2, No murmurs  Abdomen - BS+, Soft, NTND  Extremities - No C/C/E, No calf tenderness   Neurologic Exam -                    Cognitive - Awake, Alert, AAO to self, place, date, year, situation     Communication - Fluent, No dysarthria     Motor - No focal deficits.  Motor 5/5 with exception of left hip N/T         Sensory - Intact to LT   Psychiatric - Mood stable, Affect WNL    Functional status:  Transfers: supervision  Gait: Pt ambulated 100' with RW supervision  Stairs: SAC with supervision  ADLS: Toileting, LE/UE dress supervision  Functional training: Bed mobility supervision, toilet and bed transfers min assist      ----------------------------------------------------------------------------------------  ASSESSMENT/PLAN  71F s/p left hip OA S/P MARIANO Dr Ortiz now with functional deficits    Comprehensive rehab FWB LLE with post dislocation precautions and abduction pillow at rest and bedtime  Aquacel dressing placed 4/3/17: Remove 7 days from placement date  Staples to be removed in office post op day 10-14-Appt made for 17  HTN - Lasix + KCl  Mood - Zoloft  ENT - Flonase  CAD - Lipitor  Gout - Allopurinol  GI/Bowel - Colace, Senna  UTI - Cipro & Florastor x5 days (-)  Pain/Neuropathy - Tylenol PRN, Lyrica, Nucynta PRN  DVT PPX - ASA 325mg bid x 6wks (started 17)  Anemia-Acute blood loss-Cont Fe  Diet - Regular/Thins, Vit C, Iron, MgOx, MVI    Continue comprehensive program of 3hrs/day consisting of OT & PT.  D/C planning:  home with outpatient PT 17

## 2017-04-13 VITALS
OXYGEN SATURATION: 98 % | RESPIRATION RATE: 18 BRPM | DIASTOLIC BLOOD PRESSURE: 80 MMHG | SYSTOLIC BLOOD PRESSURE: 140 MMHG | HEART RATE: 78 BPM | TEMPERATURE: 98 F

## 2017-04-13 PROCEDURE — 99238 HOSP IP/OBS DSCHRG MGMT 30/<: CPT

## 2017-04-13 RX ORDER — SERTRALINE 25 MG/1
1 TABLET, FILM COATED ORAL
Qty: 0 | Refills: 0 | DISCHARGE
Start: 2017-04-13

## 2017-04-13 RX ORDER — ACETAMINOPHEN 500 MG
2 TABLET ORAL
Qty: 0 | Refills: 0 | DISCHARGE
Start: 2017-04-13

## 2017-04-13 RX ORDER — ASPIRIN/CALCIUM CARB/MAGNESIUM 324 MG
1 TABLET ORAL
Qty: 60 | Refills: 0
Start: 2017-04-13 | End: 2017-05-13

## 2017-04-13 RX ORDER — TAPENTADOL HYDROCHLORIDE 50 MG/1
1 TABLET, FILM COATED ORAL
Qty: 42 | Refills: 0
Start: 2017-04-13 | End: 2017-04-20

## 2017-04-13 RX ORDER — CHOLECALCIFEROL (VITAMIN D3) 125 MCG
1 CAPSULE ORAL
Qty: 30 | Refills: 0
Start: 2017-04-13 | End: 2017-05-13

## 2017-04-13 RX ORDER — ASCORBIC ACID 60 MG
1 TABLET,CHEWABLE ORAL
Qty: 0 | Refills: 0 | DISCHARGE
Start: 2017-04-13

## 2017-04-13 RX ADMIN — Medication 50 MILLIGRAM(S): at 05:31

## 2017-04-13 RX ADMIN — Medication 650 MILLIGRAM(S): at 05:26

## 2017-04-13 RX ADMIN — Medication 325 MILLIGRAM(S): at 05:28

## 2017-04-13 RX ADMIN — Medication 20 MILLIGRAM(S): at 05:29

## 2017-04-13 RX ADMIN — Medication 1 TABLET(S): at 08:29

## 2017-04-13 RX ADMIN — TAPENTADOL HYDROCHLORIDE 100 MILLIGRAM(S): 50 TABLET, FILM COATED ORAL at 08:28

## 2017-04-13 RX ADMIN — TAPENTADOL HYDROCHLORIDE 100 MILLIGRAM(S): 50 TABLET, FILM COATED ORAL at 00:01

## 2017-04-13 NOTE — PROGRESS NOTE ADULT - SUBJECTIVE AND OBJECTIVE BOX
Pt is a 70y/o female with left hip OA s/p left MARIANO 4/3/17 Dr Ortiz at [facility].      Patient seen and examined.  Pt c/o intermittent mild pain left hip relieved with Nucynta.  Denies dysuria, frequency, urgency.  +BM daily      RECENT LABS/IMAGING                        11.1   6.8   )-----------( 315      ( 10 Apr 2017 06:57 )             34.0   04-10    143  |  101  |  14.0  ----------------------------<  95  4.2   |  32.0<H>  |  0.47<L>    Ca    9.3      10 Apr 2017 06:57    TPro  6.8  /  Alb  3.3  /  TBili  0.4  /  DBili  x   /  AST  17  /  ALT  22  /  AlkPhos  82  04-10        Urinalysis Basic - ( 2017 01:40 )    Color: Yellow / Appearance: very cloudy / S.010 / pH: x  Gluc: x / Ketone: Negative  / Bili: Negative / Urobili: Negative mg/dL   Blood: x / Protein: Negative mg/dL / Nitrite: Negative   Leuk Esterase: Moderate / RBC: 3-5 /HPF / WBC 11-25   Sq Epi: x / Non Sq Epi: Occasional / Bacteria: Moderate        VITALS  T(C): 36.9  T(F): 98.5, Max: 98.5 (-09 @ 05:54)  HR: 75 (68 - 76)  BP: 160/80 (130/60 - 160/80)  RR:  (16 - 16)  SpO2:  (94% - 98%)  Wt(kg): --    MEDICATIONS   allopurinol 100milliGRAM(s) daily  ascorbic acid 500milliGRAM(s) daily  aspirin 325milliGRAM(s) two times a day  atorvastatin 10milliGRAM(s) at bedtime  docusate sodium 100milliGRAM(s) two times a day  ferrous    sulfate 325milliGRAM(s) three times a day with meals  fluticasone propionate 50 MICROgram(s)/spray Nasal Spray 2Spray(s) two times a day  furosemide    Tablet 20milliGRAM(s) daily  pregabalin 50milliGRAM(s) two times a day  sertraline 25milliGRAM(s) daily  magnesium oxide 400milliGRAM(s) daily  acetaminophen   Tablet 650milliGRAM(s) every 6 hours PRN  multivitamin 1Tablet(s) daily  acetaminophen   Tablet. 650milliGRAM(s) every 6 hours PRN  potassium chloride    Tablet ER 10milliEquivalent(s) daily  senna 2Tablet(s) at bedtime  tapentadol 100milliGRAM(s) every 6 hours PRN      --------------------------------------------------------------------  PHYSICAL EXAM  Constitutional - NAD, Comfortable  Chest - CTA bilaterally, No wheeze, No rhonchi, No crackles  Cardiovascular - RRR, S1S2, No murmurs  Abdomen - BS+, Soft, NTND  Extremities - No C/C/E, No calf tenderness  Left hip incision with Aquacel dressing removed-incision with staples CDI.  No erythema noted  Neurologic Exam -                    Cognitive - Awake, Alert, AAO to self, place, date, year, situation     Communication - Fluent, No dysarthria     Motor - No focal deficits.  Motor 5/5 with exception of left hip N/T         Sensory - Intact to LT   Psychiatric - Mood stable, Affect WNL    Functional status:  Transfers: Mod I  Gait: Pt ambulated 150' with RW mod I  Stairs: SAC with mod I  ADLS: Toileting, LE/UE dress mod I  Functional training: Bed mobility, toilet and bed transfers mod I      ----------------------------------------------------------------------------------------  ASSESSMENT/PLAN  71F s/p left hip OA S/P MARIANO Dr Ortiz now with functional deficits    Comprehensive rehab FWB LLE with post dislocation precautions and abduction pillow at rest and bedtime  Staples to be removed in office post op day 10-14-Appt made for today for evaluation and staple removal  HTN - Lasix + KCl  Mood - Zoloft  ENT - Flonase  CAD - Lipitor  Gout - Allopurinol  GI/Bowel - Colace, Senna  UTI - Completed 5 day course of Cipro & Florastor (-)  Pain/Neuropathy - Tylenol PRN, Lyrica, Nucynta PRN  DVT PPX - ASA 325mg bid x 6wks (started 17)  Anemia-Acute blood loss-Cont Fe  Diet - Regular/Thins, Vit C, Iron, MgOx, MVI    Comprehensive program of 3hrs/day consisting of OT & PT.  D/C planning:  home with outpatient PT today

## 2017-04-15 ENCOUNTER — OUTPATIENT (OUTPATIENT)
Dept: OUTPATIENT SERVICES | Facility: HOSPITAL | Age: 72
LOS: 1 days | End: 2017-04-15
Payer: MEDICARE

## 2017-04-15 DIAGNOSIS — Z51.89 ENCOUNTER FOR OTHER SPECIFIED AFTERCARE: ICD-10-CM

## 2017-04-15 DIAGNOSIS — M16.12 UNILATERAL PRIMARY OSTEOARTHRITIS, LEFT HIP: ICD-10-CM

## 2017-04-17 PROCEDURE — 82306 VITAMIN D 25 HYDROXY: CPT

## 2017-04-17 PROCEDURE — 97162 PT EVAL MOD COMPLEX 30 MIN: CPT

## 2017-04-17 PROCEDURE — 97750 PHYSICAL PERFORMANCE TEST: CPT

## 2017-04-17 PROCEDURE — 87186 SC STD MICRODIL/AGAR DIL: CPT

## 2017-04-17 PROCEDURE — 97110 THERAPEUTIC EXERCISES: CPT

## 2017-04-17 PROCEDURE — 36415 COLL VENOUS BLD VENIPUNCTURE: CPT

## 2017-04-17 PROCEDURE — 85027 COMPLETE CBC AUTOMATED: CPT

## 2017-04-17 PROCEDURE — 97116 GAIT TRAINING THERAPY: CPT

## 2017-04-17 PROCEDURE — 97112 NEUROMUSCULAR REEDUCATION: CPT

## 2017-04-17 PROCEDURE — 94640 AIRWAY INHALATION TREATMENT: CPT

## 2017-04-17 PROCEDURE — 83735 ASSAY OF MAGNESIUM: CPT

## 2017-04-17 PROCEDURE — 81001 URINALYSIS AUTO W/SCOPE: CPT

## 2017-04-17 PROCEDURE — 97530 THERAPEUTIC ACTIVITIES: CPT

## 2017-04-17 PROCEDURE — 87086 URINE CULTURE/COLONY COUNT: CPT

## 2017-04-17 PROCEDURE — 80048 BASIC METABOLIC PNL TOTAL CA: CPT

## 2017-04-17 PROCEDURE — 97535 SELF CARE MNGMENT TRAINING: CPT

## 2017-04-17 PROCEDURE — 80053 COMPREHEN METABOLIC PANEL: CPT

## 2017-04-17 PROCEDURE — 97166 OT EVAL MOD COMPLEX 45 MIN: CPT

## 2017-04-28 ENCOUNTER — APPOINTMENT (OUTPATIENT)
Dept: PHYSICAL MEDICINE AND REHAB | Facility: CLINIC | Age: 72
End: 2017-04-28

## 2017-04-28 DIAGNOSIS — R11.0 NAUSEA: ICD-10-CM

## 2017-04-28 RX ORDER — PREGABALIN 50 MG/1
50 CAPSULE ORAL
Refills: 0 | Status: ACTIVE | COMMUNITY

## 2017-04-28 RX ORDER — PROCHLORPERAZINE MALEATE 5 MG
1 TABLET ORAL
Qty: 45 | Refills: 0
Start: 2017-04-28

## 2017-04-28 RX ORDER — ONDANSETRON 8 MG/1
1 TABLET, FILM COATED ORAL
Qty: 25 | Refills: 0 | OUTPATIENT
Start: 2017-04-28

## 2017-06-21 PROCEDURE — 97110 THERAPEUTIC EXERCISES: CPT | Mod: KX

## 2017-06-21 PROCEDURE — G8980: CPT | Mod: CJ

## 2017-06-21 PROCEDURE — G8978: CPT | Mod: CM

## 2017-06-21 PROCEDURE — 97162 PT EVAL MOD COMPLEX 30 MIN: CPT | Mod: KX

## 2017-06-21 PROCEDURE — G8979: CPT | Mod: CK

## 2017-06-21 PROCEDURE — 97010 HOT OR COLD PACKS THERAPY: CPT

## 2017-11-15 ENCOUNTER — TRANSCRIPTION ENCOUNTER (OUTPATIENT)
Age: 72
End: 2017-11-15

## 2017-12-06 ENCOUNTER — OUTPATIENT (OUTPATIENT)
Dept: OUTPATIENT SERVICES | Facility: HOSPITAL | Age: 72
LOS: 1 days | End: 2017-12-06
Payer: MEDICARE

## 2017-12-06 DIAGNOSIS — I63.9 CEREBRAL INFARCTION, UNSPECIFIED: ICD-10-CM

## 2017-12-06 DIAGNOSIS — Z51.89 ENCOUNTER FOR OTHER SPECIFIED AFTERCARE: ICD-10-CM

## 2017-12-06 DIAGNOSIS — R26.89 OTHER ABNORMALITIES OF GAIT AND MOBILITY: ICD-10-CM

## 2017-12-28 PROCEDURE — 97162 PT EVAL MOD COMPLEX 30 MIN: CPT

## 2017-12-28 PROCEDURE — G8978: CPT | Mod: CJ

## 2017-12-28 PROCEDURE — G8980: CPT | Mod: CJ

## 2017-12-28 PROCEDURE — 97110 THERAPEUTIC EXERCISES: CPT

## 2017-12-28 PROCEDURE — 97112 NEUROMUSCULAR REEDUCATION: CPT

## 2017-12-28 PROCEDURE — 97116 GAIT TRAINING THERAPY: CPT | Mod: KX

## 2017-12-28 PROCEDURE — G8979: CPT | Mod: CI

## 2018-09-24 ENCOUNTER — APPOINTMENT (OUTPATIENT)
Dept: INTERNAL MEDICINE | Facility: CLINIC | Age: 73
End: 2018-09-24

## 2019-05-06 ENCOUNTER — APPOINTMENT (OUTPATIENT)
Dept: PULMONOLOGY | Facility: CLINIC | Age: 74
End: 2019-05-06
Payer: MEDICARE

## 2019-05-06 VITALS
HEIGHT: 61 IN | BODY MASS INDEX: 42.67 KG/M2 | HEART RATE: 89 BPM | OXYGEN SATURATION: 95 % | DIASTOLIC BLOOD PRESSURE: 68 MMHG | WEIGHT: 226 LBS | SYSTOLIC BLOOD PRESSURE: 128 MMHG

## 2019-05-06 VITALS — RESPIRATION RATE: 16 BRPM

## 2019-05-06 PROCEDURE — 99204 OFFICE O/P NEW MOD 45 MIN: CPT

## 2019-05-06 RX ORDER — ONDANSETRON 4 MG/1
4 TABLET, ORALLY DISINTEGRATING ORAL
Qty: 45 | Refills: 0 | Status: DISCONTINUED | COMMUNITY
Start: 2017-04-28 | End: 2019-05-06

## 2019-05-06 RX ORDER — LOSARTAN POTASSIUM 25 MG/1
25 TABLET, FILM COATED ORAL
Refills: 0 | Status: ACTIVE | COMMUNITY

## 2019-05-06 RX ORDER — TAPENTADOL HYDROCHLORIDE 100 MG/1
100 TABLET, FILM COATED ORAL
Refills: 0 | Status: DISCONTINUED | COMMUNITY
End: 2019-05-06

## 2019-05-06 RX ORDER — GUAIFENESIN 600 MG/1
600 TABLET, EXTENDED RELEASE ORAL
Refills: 0 | Status: ACTIVE | COMMUNITY

## 2019-05-06 RX ORDER — TRAMADOL HYDROCHLORIDE 50 MG/1
50 TABLET, COATED ORAL
Refills: 0 | Status: DISCONTINUED | COMMUNITY
End: 2019-05-06

## 2019-05-06 RX ORDER — PREDNISONE 50 MG/1
TABLET ORAL
Refills: 0 | Status: DISCONTINUED | COMMUNITY
End: 2019-05-06

## 2019-05-06 NOTE — HISTORY OF PRESENT ILLNESS
[FreeTextEntry1] : The patient has been seen here in the past, the last time being about 6 years ago. She has a 15 year history of obstructive sleep apnea. She's had an oral appliance since diagnosis. Her current oral appliance was about 5 years old. It is starting to fail with some residual snoring. She denies excessive daytime sleepiness. There is been some bite change over the years. She will be going for a new oral appliance. 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 This occurred 2 years ago. It doesn't seem to have changed her sleep pattern. She denies shortness of breath cough or wheeze.

## 2019-05-06 NOTE — CONSULT LETTER
[Dear  ___] : Dear  [unfilled], [Consult Letter:] : I had the pleasure of evaluating your patient, [unfilled]. [Please see my note below.] : Please see my note below. [Consult Closing:] : Thank you very much for allowing me to participate in the care of this patient.  If you have any questions, please do not hesitate to contact me. [Sincerely,] : Sincerely, [FreeTextEntry3] : Emily Hyde MD FCCP\par D-ABSM\par ABIM board certified in  Pulmonary diseases, Sleep medicine\par Internal medicine\par  [DrZahra  ___] : Dr. CASSIDY

## 2019-05-06 NOTE — ASSESSMENT
[FreeTextEntry1] : The patient has known severe obstructive sleep apnea. She needs a new oral appliance. A repeat sleep study has been ordered to document current condition. She will then go for oral appliance I will see her back after the appliance is fitted for a repeat home sleep study with the appliance in place.

## 2019-05-06 NOTE — PHYSICAL EXAM
[Eyelids - No Xanthelasma] : the eyelids demonstrated no xanthelasmas [Normal Conjunctiva] : the conjunctiva exhibited no abnormalities [Low Lying Soft Palate] : low lying soft palate [Elongated Uvula] : elongated uvula [Enlarged Base of the Tongue] : enlargement of the base of the tongue [III] : III [FreeTextEntry1] : Hoarse voice [Neck Appearance] : the appearance of the neck was normal [Neck Cervical Mass (___cm)] : no neck mass was observed [Jugular Venous Distention Increased] : there was no jugular-venous distention [Thyroid Diffuse Enlargement] : the thyroid was not enlarged [Thyroid Nodule] : there were no palpable thyroid nodules [Heart Sounds] : normal S1 and S2 [Heart Rate And Rhythm] : heart rate and rhythm were normal [Murmurs] : no murmurs present [Respiration, Rhythm And Depth] : normal respiratory rhythm and effort [Exaggerated Use Of Accessory Muscles For Inspiration] : no accessory muscle use [Auscultation Breath Sounds / Voice Sounds] : lungs were clear to auscultation bilaterally [Abdomen Tenderness] : non-tender [Abdomen Soft] : soft [Abdomen Mass (___ Cm)] : no abdominal mass palpated [Nail Clubbing] : no clubbing of the fingernails [Abnormal Walk] : normal gait [Gait - Sufficient For Exercise Testing] : the gait was sufficient for exercise testing [Cyanosis, Localized] : no localized cyanosis [Petechial Hemorrhages (___cm)] : no petechial hemorrhages [Skin Color & Pigmentation] : normal skin color and pigmentation [] : no rash [Skin Turgor] : normal skin turgor [Deep Tendon Reflexes (DTR)] : deep tendon reflexes were 2+ and symmetric [Sensation] : the sensory exam was normal to light touch and pinprick [No Focal Deficits] : no focal deficits [Impaired Insight] : insight and judgment were intact [Oriented To Time, Place, And Person] : oriented to person, place, and time [Affect] : the affect was normal

## 2019-05-17 ENCOUNTER — OUTPATIENT (OUTPATIENT)
Dept: OUTPATIENT SERVICES | Facility: HOSPITAL | Age: 74
LOS: 1 days | End: 2019-05-17
Payer: MEDICARE

## 2019-05-17 DIAGNOSIS — G47.33 OBSTRUCTIVE SLEEP APNEA (ADULT) (PEDIATRIC): ICD-10-CM

## 2019-05-17 PROCEDURE — 95806 SLEEP STUDY UNATT&RESP EFFT: CPT

## 2019-05-17 PROCEDURE — 95806 SLEEP STUDY UNATT&RESP EFFT: CPT | Mod: 26

## 2019-05-17 PROCEDURE — G0399: CPT

## 2019-06-13 ENCOUNTER — APPOINTMENT (OUTPATIENT)
Dept: PULMONOLOGY | Facility: CLINIC | Age: 74
End: 2019-06-13
Payer: MEDICARE

## 2019-06-13 VITALS
WEIGHT: 230 LBS | DIASTOLIC BLOOD PRESSURE: 80 MMHG | OXYGEN SATURATION: 96 % | BODY MASS INDEX: 43.43 KG/M2 | HEART RATE: 78 BPM | HEIGHT: 61 IN | SYSTOLIC BLOOD PRESSURE: 130 MMHG

## 2019-06-13 PROCEDURE — 94727 GAS DIL/WSHOT DETER LNG VOL: CPT

## 2019-06-13 PROCEDURE — 94010 BREATHING CAPACITY TEST: CPT

## 2019-06-13 PROCEDURE — 99214 OFFICE O/P EST MOD 30 MIN: CPT | Mod: 25

## 2019-06-13 PROCEDURE — 94729 DIFFUSING CAPACITY: CPT

## 2019-06-13 PROCEDURE — 85018 HEMOGLOBIN: CPT | Mod: QW

## 2019-06-13 NOTE — PROCEDURE
[FreeTextEntry1] : Pulmonary function studies are normal other than obesity related volume changes. No evidence for upper airway obstruction from vocal cord paralysis is noted.

## 2019-06-13 NOTE — HISTORY OF PRESENT ILLNESS
[FreeTextEntry1] : She came in today to discuss her recent home sleep study. She slept with the oral appliance in during the study because she can't sleep without it. She's also been complaining of increasing dyspnea on exertion going on the past several months. Her cardiac workup was unremarkable.

## 2019-06-13 NOTE — CONSULT LETTER
[Dear  ___] : Dear  [unfilled], [Please see my note below.] : Please see my note below. [Courtesy Letter:] : I had the pleasure of seeing your patient, [unfilled], in my office today. [Consult Closing:] : Thank you very much for allowing me to participate in the care of this patient.  If you have any questions, please do not hesitate to contact me. [Sincerely,] : Sincerely, [FreeTextEntry3] : Emily Hyde MD FCCP\par D-ABSM\par ABIM board certified in  Pulmonary diseases, Sleep medicine\par Internal medicine\par

## 2019-06-13 NOTE — ASSESSMENT
[FreeTextEntry1] : Patient has ongoing obstructive sleep apnea and needs a new oral appliance. After the new one is fitted we will retest her with it and to make sure he is adequately treating her sleep apnea.\par \par Patient with dyspnea on exertion likely due to obesity and deconditioning. Since she's had negative cardiac and pulmonary workups there is no contraindication to exercising.

## 2019-06-13 NOTE — PHYSICAL EXAM
[Normal Conjunctiva] : the conjunctiva exhibited no abnormalities [Eyelids - No Xanthelasma] : the eyelids demonstrated no xanthelasmas [Elongated Uvula] : elongated uvula [Low Lying Soft Palate] : low lying soft palate [Enlarged Base of the Tongue] : enlargement of the base of the tongue [III] : III [Neck Appearance] : the appearance of the neck was normal [Neck Cervical Mass (___cm)] : no neck mass was observed [Jugular Venous Distention Increased] : there was no jugular-venous distention [Thyroid Diffuse Enlargement] : the thyroid was not enlarged [Thyroid Nodule] : there were no palpable thyroid nodules [Heart Rate And Rhythm] : heart rate and rhythm were normal [Murmurs] : no murmurs present [Heart Sounds] : normal S1 and S2 [Respiration, Rhythm And Depth] : normal respiratory rhythm and effort [Exaggerated Use Of Accessory Muscles For Inspiration] : no accessory muscle use [Auscultation Breath Sounds / Voice Sounds] : lungs were clear to auscultation bilaterally [Abdomen Soft] : soft [Abdomen Tenderness] : non-tender [Abdomen Mass (___ Cm)] : no abdominal mass palpated [Abnormal Walk] : normal gait [Gait - Sufficient For Exercise Testing] : the gait was sufficient for exercise testing [Cyanosis, Localized] : no localized cyanosis [Nail Clubbing] : no clubbing of the fingernails [Petechial Hemorrhages (___cm)] : no petechial hemorrhages [Deep Tendon Reflexes (DTR)] : deep tendon reflexes were 2+ and symmetric [Sensation] : the sensory exam was normal to light touch and pinprick [No Focal Deficits] : no focal deficits [Oriented To Time, Place, And Person] : oriented to person, place, and time [Impaired Insight] : insight and judgment were intact [Affect] : the affect was normal [Skin Color & Pigmentation] : normal skin color and pigmentation [Skin Turgor] : normal skin turgor [] : no rash [FreeTextEntry1] : Hoarse voice

## 2021-03-15 ENCOUNTER — APPOINTMENT (OUTPATIENT)
Dept: PULMONOLOGY | Facility: CLINIC | Age: 76
End: 2021-03-15
Payer: MEDICARE

## 2021-03-15 VITALS
OXYGEN SATURATION: 98 % | HEIGHT: 60 IN | RESPIRATION RATE: 16 BRPM | TEMPERATURE: 98.4 F | HEART RATE: 88 BPM | BODY MASS INDEX: 46.72 KG/M2 | DIASTOLIC BLOOD PRESSURE: 80 MMHG | SYSTOLIC BLOOD PRESSURE: 132 MMHG | WEIGHT: 238 LBS

## 2021-03-15 PROCEDURE — 99214 OFFICE O/P EST MOD 30 MIN: CPT

## 2021-03-15 RX ORDER — EVOLOCUMAB 420 MG/3.5
420 KIT SUBCUTANEOUS
Refills: 0 | Status: ACTIVE | COMMUNITY

## 2021-03-15 NOTE — HISTORY OF PRESENT ILLNESS
[TextBox_4] : The patient has been followed here for obstructive sleep apnea on oral appliance therapy. She has vocal cord paralysis post intubation from several years ago. He is been complaining of some increased shortness of breath recently. She went to see a cardiologist and a cardiac PET scan was scheduled. She was told that she was wheezing and so the PET scan was postponed. She came here for further evaluation.\par \par The patient denies hearing wheezing and having coughing. Pulmonary function studies have been normal in the past without any evidence for extrathoracic upper airway obstruction from vocal cord paralysis.Her weight is up about 12 pounds since last year.

## 2021-03-15 NOTE — CONSULT LETTER
[Dear  ___] : Dear  [unfilled], [Courtesy Letter:] : I had the pleasure of seeing your patient, [unfilled], in my office today. [Please see my note below.] : Please see my note below. [Consult Closing:] : Thank you very much for allowing me to participate in the care of this patient.  If you have any questions, please do not hesitate to contact me. [Sincerely,] : Sincerely, [FreeTextEntry3] : Emily Hyde MD FCCP\par D-ABSM\par ABIM board certified in  Pulmonary diseases, Sleep medicine\par Internal medicine\par  [DrZahra  ___] : Dr. CASSIDY

## 2021-03-15 NOTE — ASSESSMENT
[FreeTextEntry1] : The patient has normal lung function. Her lungs are clear and I do not hear wheezing. It is possible that she may experience some transient upper airway noises from her vocal cord paralysis which may have been mistaken for wheezing. In any event, no medications are needed, and she is cleared for cardiac PET scan from pulmonary point of view.

## 2021-03-15 NOTE — PHYSICAL EXAM
[Normal Conjunctiva] : the conjunctiva exhibited no abnormalities [Eyelids - No Xanthelasma] : the eyelids demonstrated no xanthelasmas [Low Lying Soft Palate] : low lying soft palate [Elongated Uvula] : elongated uvula [Enlarged Base of the Tongue] : enlargement of the base of the tongue [III] : III [FreeTextEntry1] : Hoarse voice [Neck Appearance] : the appearance of the neck was normal [Neck Cervical Mass (___cm)] : no neck mass was observed [Jugular Venous Distention Increased] : there was no jugular-venous distention [Thyroid Diffuse Enlargement] : the thyroid was not enlarged [Thyroid Nodule] : there were no palpable thyroid nodules [Heart Rate And Rhythm] : heart rate and rhythm were normal [Heart Sounds] : normal S1 and S2 [Murmurs] : no murmurs present [Respiration, Rhythm And Depth] : normal respiratory rhythm and effort [Exaggerated Use Of Accessory Muscles For Inspiration] : no accessory muscle use [Auscultation Breath Sounds / Voice Sounds] : lungs were clear to auscultation bilaterally [Abdomen Soft] : soft [Abdomen Tenderness] : non-tender [Abdomen Mass (___ Cm)] : no abdominal mass palpated [Abnormal Walk] : normal gait [Gait - Sufficient For Exercise Testing] : the gait was sufficient for exercise testing [Nail Clubbing] : no clubbing of the fingernails [Cyanosis, Localized] : no localized cyanosis [Petechial Hemorrhages (___cm)] : no petechial hemorrhages [Deep Tendon Reflexes (DTR)] : deep tendon reflexes were 2+ and symmetric [Sensation] : the sensory exam was normal to light touch and pinprick [No Focal Deficits] : no focal deficits [Oriented To Time, Place, And Person] : oriented to person, place, and time [Impaired Insight] : insight and judgment were intact [Affect] : the affect was normal [Skin Color & Pigmentation] : normal skin color and pigmentation [Skin Turgor] : normal skin turgor [] : no rash

## 2021-05-04 ENCOUNTER — APPOINTMENT (OUTPATIENT)
Dept: PULMONOLOGY | Facility: CLINIC | Age: 76
End: 2021-05-04
Payer: MEDICARE

## 2021-05-04 VITALS — RESPIRATION RATE: 16 BRPM | HEART RATE: 84 BPM | OXYGEN SATURATION: 97 %

## 2021-05-04 VITALS — DIASTOLIC BLOOD PRESSURE: 80 MMHG | BODY MASS INDEX: 45.51 KG/M2 | WEIGHT: 233 LBS | SYSTOLIC BLOOD PRESSURE: 130 MMHG

## 2021-05-04 DIAGNOSIS — J38.00 PARALYSIS OF VOCAL CORDS AND LARYNX, UNSPECIFIED: ICD-10-CM

## 2021-05-04 PROCEDURE — 99214 OFFICE O/P EST MOD 30 MIN: CPT

## 2021-05-04 RX ORDER — CLOPIDOGREL 75 MG/1
75 TABLET, FILM COATED ORAL
Refills: 0 | Status: COMPLETED | COMMUNITY
End: 2021-05-04

## 2021-05-04 RX ORDER — TICAGRELOR 60 MG/1
TABLET ORAL
Refills: 0 | Status: ACTIVE | COMMUNITY

## 2021-05-05 DIAGNOSIS — Z01.818 ENCOUNTER FOR OTHER PREPROCEDURAL EXAMINATION: ICD-10-CM

## 2021-05-06 ENCOUNTER — APPOINTMENT (OUTPATIENT)
Dept: DISASTER EMERGENCY | Facility: CLINIC | Age: 76
End: 2021-05-06

## 2021-05-07 LAB — SARS-COV-2 N GENE NPH QL NAA+PROBE: NOT DETECTED

## 2021-05-11 ENCOUNTER — OUTPATIENT (OUTPATIENT)
Dept: OUTPATIENT SERVICES | Facility: HOSPITAL | Age: 76
LOS: 1 days | End: 2021-05-11
Payer: MEDICARE

## 2021-05-11 DIAGNOSIS — G47.33 OBSTRUCTIVE SLEEP APNEA (ADULT) (PEDIATRIC): ICD-10-CM

## 2021-05-11 PROCEDURE — 95811 POLYSOM 6/>YRS CPAP 4/> PARM: CPT | Mod: 26

## 2021-05-11 PROCEDURE — 95811 POLYSOM 6/>YRS CPAP 4/> PARM: CPT

## 2021-05-18 NOTE — PHYSICAL EXAM
[Normal Conjunctiva] : the conjunctiva exhibited no abnormalities [Eyelids - No Xanthelasma] : the eyelids demonstrated no xanthelasmas [Low Lying Soft Palate] : low lying soft palate [Elongated Uvula] : elongated uvula [Enlarged Base of the Tongue] : enlargement of the base of the tongue [III] : III [Neck Appearance] : the appearance of the neck was normal [Neck Cervical Mass (___cm)] : no neck mass was observed [Jugular Venous Distention Increased] : there was no jugular-venous distention [Thyroid Diffuse Enlargement] : the thyroid was not enlarged [Thyroid Nodule] : there were no palpable thyroid nodules [Heart Rate And Rhythm] : heart rate and rhythm were normal [Heart Sounds] : normal S1 and S2 [Murmurs] : no murmurs present [Respiration, Rhythm And Depth] : normal respiratory rhythm and effort [Exaggerated Use Of Accessory Muscles For Inspiration] : no accessory muscle use [Auscultation Breath Sounds / Voice Sounds] : lungs were clear to auscultation bilaterally [Abdomen Soft] : soft [Abdomen Tenderness] : non-tender [Abdomen Mass (___ Cm)] : no abdominal mass palpated [Abnormal Walk] : normal gait [Gait - Sufficient For Exercise Testing] : the gait was sufficient for exercise testing [Nail Clubbing] : no clubbing of the fingernails [Cyanosis, Localized] : no localized cyanosis [Petechial Hemorrhages (___cm)] : no petechial hemorrhages [Deep Tendon Reflexes (DTR)] : deep tendon reflexes were 2+ and symmetric [Sensation] : the sensory exam was normal to light touch and pinprick [No Focal Deficits] : no focal deficits [Oriented To Time, Place, And Person] : oriented to person, place, and time [Impaired Insight] : insight and judgment were intact [Affect] : the affect was normal [Skin Color & Pigmentation] : normal skin color and pigmentation [Skin Turgor] : normal skin turgor [] : no rash [FreeTextEntry1] : Hoarse voice

## 2021-05-18 NOTE — ADDENDUM
[FreeTextEntry1] : Oral Appliance and CPAP failed to control the patient's sleep apnea, and BiPAP is being initiated.

## 2021-05-18 NOTE — ASSESSMENT
[FreeTextEntry1] : Patient is having ongoing shortness of breath. We will repeat pulmonary function studies at this time to see if there's been any change. Last pulmonary function studies 2 years ago were normal.\par \par Patient is having issues with her sleep apnea. A split night sleep study has been ordered so that we can ascertain current AHI and pressures. I told her we could get her a light touch mask.f/u after studies

## 2021-06-01 ENCOUNTER — LABORATORY RESULT (OUTPATIENT)
Age: 76
End: 2021-06-01

## 2021-06-01 ENCOUNTER — APPOINTMENT (OUTPATIENT)
Dept: DISASTER EMERGENCY | Facility: CLINIC | Age: 76
End: 2021-06-01

## 2021-06-03 ENCOUNTER — APPOINTMENT (OUTPATIENT)
Dept: PULMONOLOGY | Facility: CLINIC | Age: 76
End: 2021-06-03
Payer: MEDICARE

## 2021-06-03 VITALS — HEIGHT: 61 IN | WEIGHT: 238 LBS | BODY MASS INDEX: 44.93 KG/M2 | TEMPERATURE: 98 F

## 2021-06-03 VITALS — OXYGEN SATURATION: 97 % | HEART RATE: 83 BPM

## 2021-06-03 PROCEDURE — 94729 DIFFUSING CAPACITY: CPT

## 2021-06-03 PROCEDURE — 94010 BREATHING CAPACITY TEST: CPT

## 2021-06-03 PROCEDURE — 85018 HEMOGLOBIN: CPT | Mod: QW

## 2021-06-03 PROCEDURE — 99214 OFFICE O/P EST MOD 30 MIN: CPT | Mod: 25

## 2021-06-03 PROCEDURE — 94727 GAS DIL/WSHOT DETER LNG VOL: CPT

## 2021-06-03 RX ORDER — ALPHA LIPOIC ACID 200 MG
CAPSULE ORAL
Refills: 0 | Status: ACTIVE | COMMUNITY

## 2021-06-03 RX ORDER — PRAVASTATIN SODIUM 20 MG/1
20 TABLET ORAL
Refills: 0 | Status: DISCONTINUED | COMMUNITY
End: 2021-06-03

## 2021-06-03 RX ORDER — PERPHENAZINE 8 MG
TABLET ORAL
Refills: 0 | Status: ACTIVE | COMMUNITY

## 2021-06-03 RX ORDER — ICOSAPENT ETHYL 1000 MG/1
1 CAPSULE ORAL
Refills: 0 | Status: DISCONTINUED | COMMUNITY
End: 2021-06-03

## 2021-06-03 NOTE — CONSULT LETTER
[Dear  ___] : Dear  [unfilled], [Courtesy Letter:] : I had the pleasure of seeing your patient, [unfilled], in my office today. [Please see my note below.] : Please see my note below. [Consult Closing:] : Thank you very much for allowing me to participate in the care of this patient.  If you have any questions, please do not hesitate to contact me. [Sincerely,] : Sincerely, [FreeTextEntry3] : Emily Hyde MD FCCP\par D-ABSM\par ABIM board certified in  Pulmonary diseases, Sleep medicine\par Internal medicine\par

## 2021-06-03 NOTE — ASSESSMENT
[FreeTextEntry1] : Patient's shortness of breath is on the basis of restrictive lung disease from obesity. I explained that to the patient and explained that weight loss is the key for her.\par \par The patient has severe obstructive sleep apnea. She had ongoing significant apneas at BiPAP 17/13. There is no way that an oral appliance can be created to treat with packing of this severity. Auto BiPAP with an IPAP maximum of 22, EPAP minimum of 10, and pressure support of 4 has been ordered. The patient was instructed to begin wearing it with the goal being all night every night and I will see her back in 3 months to review compliance and efficacy.

## 2021-06-03 NOTE — PROCEDURE
[FreeTextEntry1] : Pulmonary function studies are essentially normal other than obesity related volume changes. Findings are similar to what was seen several years ago.

## 2021-06-03 NOTE — HISTORY OF PRESENT ILLNESS
[TextBox_4] : Patient came in today to review her sleep study and for pulmonary function studies. Dyspnea on exertion persists. [Obstructive Sleep Apnea] : obstructive sleep apnea [Lab] : lab [TextBox_100] : 5/21 [TextBox_108] : 97 [TextBox_112] : 96 [TextBox_116] : 87 [TextBox_120] : Split--ongoing apneas at BiPAP 17/13 [ESS] : 9

## 2021-07-19 ENCOUNTER — APPOINTMENT (OUTPATIENT)
Dept: PULMONOLOGY | Facility: CLINIC | Age: 76
End: 2021-07-19
Payer: MEDICARE

## 2021-07-19 VITALS — WEIGHT: 231 LBS | HEIGHT: 61 IN | BODY MASS INDEX: 43.61 KG/M2

## 2021-07-19 VITALS
DIASTOLIC BLOOD PRESSURE: 68 MMHG | OXYGEN SATURATION: 97 % | RESPIRATION RATE: 16 BRPM | SYSTOLIC BLOOD PRESSURE: 132 MMHG | HEART RATE: 82 BPM

## 2021-07-19 PROCEDURE — 99214 OFFICE O/P EST MOD 30 MIN: CPT

## 2021-07-19 NOTE — ASSESSMENT
[FreeTextEntry1] : Patient with very severe obstructive sleep apnea doing quite well on auto BiPAP. Supplies were renewed.\par \par Shortness of breath on exertion secondary to restriction from obesity.\par \par Followup will be in 6 months.

## 2021-07-19 NOTE — HISTORY OF PRESENT ILLNESS
[Obstructive Sleep Apnea] : obstructive sleep apnea [Lab] : lab [BPAP:] : BPAP [TextBox_100] : 5/21 [TextBox_108] : 97 [TextBox_112] : 96 [TextBox_116] : 87 [TextBox_120] : Split--ongoing apneas at BiPAP 17/13 [TextBox_135] : Josefina IPIP max 22 EPAP min 10 ps 4 [TextBox_127] : 6/21 [TextBox_129] : 7/21 [TextBox_133] : 100 [TextBox_137] : 100 [TextBox_141] : 7 [TextBox_143] : 48 [TextBox_147] : 0.2 [TextBox_165] : Much more alert on BiPAP.  Minimal air leak.

## 2021-09-09 ENCOUNTER — APPOINTMENT (OUTPATIENT)
Dept: PULMONOLOGY | Facility: CLINIC | Age: 76
End: 2021-09-09

## 2022-02-03 ENCOUNTER — APPOINTMENT (OUTPATIENT)
Dept: PULMONOLOGY | Facility: CLINIC | Age: 77
End: 2022-02-03
Payer: MEDICARE

## 2022-02-03 VITALS
SYSTOLIC BLOOD PRESSURE: 160 MMHG | OXYGEN SATURATION: 97 % | RESPIRATION RATE: 16 BRPM | WEIGHT: 235 LBS | DIASTOLIC BLOOD PRESSURE: 70 MMHG | HEART RATE: 102 BPM | BODY MASS INDEX: 44.4 KG/M2

## 2022-02-03 PROCEDURE — 99214 OFFICE O/P EST MOD 30 MIN: CPT

## 2022-02-03 NOTE — ASSESSMENT
[FreeTextEntry1] : Patient bothered by a dry mouth. She does not have a heated tube, so I ordered that for her. She may need to go to a full face mask. Followup will be in 4 months.\par \par Patient's sleep apnea is severe but is doing well on BiPAP.

## 2022-02-03 NOTE — HISTORY OF PRESENT ILLNESS
[Obstructive Sleep Apnea] : obstructive sleep apnea [Lab] : lab [BPAP:] : BPAP [TextBox_100] : 5/21 [TextBox_108] : 97 [TextBox_112] : 96 [TextBox_116] : 87 [TextBox_120] : Split--ongoing apneas at BiPAP 17/13 [TextBox_135] : Josefina IPIP max 22 EPAP min 10 ps 4 [TextBox_127] : 1/22 [TextBox_129] : 1/22 [TextBox_133] : 100 [TextBox_137] : 100 [TextBox_141] : 8 [TextBox_143] : 14 [TextBox_147] : 0.2 [TextBox_165] : Much more alert on BiPAP.  Minimal air leak.  Bothered by dry mouth.Median pressure 14/10

## 2022-06-07 ENCOUNTER — APPOINTMENT (OUTPATIENT)
Dept: PULMONOLOGY | Facility: CLINIC | Age: 77
End: 2022-06-07
Payer: MEDICARE

## 2022-06-07 VITALS — WEIGHT: 230 LBS | BODY MASS INDEX: 43.46 KG/M2

## 2022-06-07 VITALS — OXYGEN SATURATION: 98 % | HEART RATE: 72 BPM | RESPIRATION RATE: 16 BRPM

## 2022-06-07 DIAGNOSIS — J98.4 OTHER DISORDERS OF LUNG: ICD-10-CM

## 2022-06-07 DIAGNOSIS — E66.9 OTHER DISORDERS OF LUNG: ICD-10-CM

## 2022-06-07 PROCEDURE — 99214 OFFICE O/P EST MOD 30 MIN: CPT

## 2022-06-07 RX ORDER — POTASSIUM CHLORIDE 750 MG/1
10 CAPSULE, EXTENDED RELEASE ORAL
Qty: 90 | Refills: 0 | Status: ACTIVE | COMMUNITY
Start: 2022-04-13

## 2022-06-07 RX ORDER — CYCLOSPORINE 0.5 MG/ML
0.05 EMULSION OPHTHALMIC
Qty: 60 | Refills: 0 | Status: ACTIVE | COMMUNITY
Start: 2022-06-03

## 2022-06-07 RX ORDER — FUROSEMIDE 20 MG/1
20 TABLET ORAL
Qty: 90 | Refills: 0 | Status: ACTIVE | COMMUNITY
Start: 2022-03-22

## 2022-06-07 RX ORDER — ALPRAZOLAM 0.25 MG/1
0.25 TABLET ORAL
Qty: 10 | Refills: 0 | Status: ACTIVE | COMMUNITY
Start: 2022-03-21

## 2022-06-07 RX ORDER — EVOLOCUMAB 140 MG/ML
140 INJECTION, SOLUTION SUBCUTANEOUS
Qty: 6 | Refills: 0 | Status: ACTIVE | COMMUNITY
Start: 2022-01-04

## 2022-06-07 RX ORDER — TIZANIDINE 4 MG/1
4 TABLET ORAL
Qty: 10 | Refills: 0 | Status: ACTIVE | COMMUNITY
Start: 2022-05-27

## 2022-06-07 RX ORDER — METOPROLOL SUCCINATE 50 MG/1
50 TABLET, EXTENDED RELEASE ORAL
Qty: 90 | Refills: 0 | Status: ACTIVE | COMMUNITY
Start: 2022-04-13

## 2022-06-07 RX ORDER — NYSTATIN 100000 [USP'U]/G
100000 CREAM TOPICAL
Qty: 30 | Refills: 0 | Status: ACTIVE | COMMUNITY
Start: 2022-03-03

## 2022-06-07 NOTE — HISTORY OF PRESENT ILLNESS
[Obstructive Sleep Apnea] : obstructive sleep apnea [Lab] : lab [BPAP:] : BPAP [TextBox_100] : 5/21 [TextBox_108] : 97 [TextBox_112] : 96 [TextBox_116] : 87 [TextBox_120] : Split--ongoing apneas at BiPAP 17/13 [TextBox_135] : Josefina IPIP max 22 EPAP min 10 ps 4 [TextBox_127] : 5/22 [TextBox_129] : 6/22 [TextBox_133] : 100 [TextBox_137] : 100 [TextBox_141] : 7 [TextBox_143] : 52 [TextBox_147] : 0.2 [TextBox_165] : Much more alert on BiPAP.  Minimal air leak. Dryness marginally better with heated tubing.\par SOB unchanged.

## 2022-06-07 NOTE — ASSESSMENT
[FreeTextEntry1] : Severe OLEGARIO with good compliance and benefit from BiPAP.  Will try FFM for dryness.\par f/u 6 months.

## 2022-12-06 ENCOUNTER — APPOINTMENT (OUTPATIENT)
Dept: PULMONOLOGY | Facility: CLINIC | Age: 77
End: 2022-12-06

## 2022-12-13 ENCOUNTER — APPOINTMENT (OUTPATIENT)
Dept: PULMONOLOGY | Facility: CLINIC | Age: 77
End: 2022-12-13

## 2022-12-13 VITALS
HEART RATE: 72 BPM | OXYGEN SATURATION: 97 % | BODY MASS INDEX: 44.75 KG/M2 | SYSTOLIC BLOOD PRESSURE: 132 MMHG | DIASTOLIC BLOOD PRESSURE: 82 MMHG | RESPIRATION RATE: 16 BRPM | WEIGHT: 237 LBS | HEIGHT: 61 IN

## 2022-12-13 PROCEDURE — 99214 OFFICE O/P EST MOD 30 MIN: CPT

## 2022-12-13 RX ORDER — LANSOPRAZOLE 30 MG/1
30 CAPSULE, DELAYED RELEASE ORAL
Refills: 0 | Status: DISCONTINUED | COMMUNITY
End: 2022-12-13

## 2022-12-13 RX ORDER — METOPROLOL SUCCINATE 25 MG/1
25 TABLET, EXTENDED RELEASE ORAL
Refills: 0 | Status: DISCONTINUED | COMMUNITY
End: 2022-12-13

## 2022-12-13 RX ORDER — ONDANSETRON 4 MG/1
4 TABLET, ORALLY DISINTEGRATING ORAL
Qty: 25 | Refills: 0 | Status: ACTIVE | COMMUNITY
Start: 2017-04-28 | End: 1900-01-01

## 2022-12-13 RX ORDER — ICOSAPENT ETHYL 1000 MG/1
1 CAPSULE ORAL
Refills: 0 | Status: ACTIVE | COMMUNITY

## 2022-12-13 RX ORDER — CLINDAMYCIN HYDROCHLORIDE 300 MG/1
300 CAPSULE ORAL
Qty: 12 | Refills: 0 | Status: DISCONTINUED | COMMUNITY
Start: 2022-03-14 | End: 2022-12-13

## 2022-12-13 RX ORDER — CIPROFLOXACIN HYDROCHLORIDE 250 MG/1
250 TABLET, FILM COATED ORAL
Qty: 10 | Refills: 0 | Status: DISCONTINUED | COMMUNITY
Start: 2022-03-01 | End: 2022-12-13

## 2022-12-13 RX ORDER — VALACYCLOVIR 500 MG/1
500 TABLET, FILM COATED ORAL
Qty: 30 | Refills: 0 | Status: DISCONTINUED | COMMUNITY
Start: 2022-03-11 | End: 2022-12-13

## 2022-12-13 NOTE — ASSESSMENT
[FreeTextEntry1] : Severe obstructive sleep apnea with excellent compliance and benefit from auto BiPAP.  I gave her nasal pillows to try.  Otherwise we will see her in a year.

## 2022-12-13 NOTE — HISTORY OF PRESENT ILLNESS
[Obstructive Sleep Apnea] : obstructive sleep apnea [Lab] : lab [BPAP:] : BPAP [TextBox_100] : 5/21 [TextBox_108] : 97 [TextBox_112] : 96 [TextBox_116] : 87 [TextBox_120] : Split--ongoing apneas at BiPAP 17/13 [TextBox_135] : Josefina IPIP max 22 EPAP min 10 ps 4 [TextBox_127] : 11/22 [TextBox_129] : 12/22 [TextBox_133] : 100 [TextBox_137] : 100 [TextBox_141] : 7 [TextBox_143] : 57 [TextBox_147] : 0.1 [TextBox_165] : Much more alert on BiPAP.  Minimal air leak. Dryness marginally better with heated tubing.\par SOB unchanged.  Did not like full facemask.  Median pressures are 14/10, maximum is 18/14

## 2023-02-22 ENCOUNTER — OFFICE (OUTPATIENT)
Dept: URBAN - METROPOLITAN AREA CLINIC 114 | Facility: CLINIC | Age: 78
Setting detail: OPHTHALMOLOGY
End: 2023-02-22
Payer: MEDICARE

## 2023-02-22 DIAGNOSIS — H25.13: ICD-10-CM

## 2023-02-22 DIAGNOSIS — H04.121: ICD-10-CM

## 2023-02-22 DIAGNOSIS — H01.004: ICD-10-CM

## 2023-02-22 DIAGNOSIS — H04.122: ICD-10-CM

## 2023-02-22 DIAGNOSIS — D31.41: ICD-10-CM

## 2023-02-22 DIAGNOSIS — H43.813: ICD-10-CM

## 2023-02-22 DIAGNOSIS — H01.002: ICD-10-CM

## 2023-02-22 DIAGNOSIS — E11.9: ICD-10-CM

## 2023-02-22 DIAGNOSIS — H01.001: ICD-10-CM

## 2023-02-22 DIAGNOSIS — H01.005: ICD-10-CM

## 2023-02-22 DIAGNOSIS — H35.033: ICD-10-CM

## 2023-02-22 DIAGNOSIS — I63.50: ICD-10-CM

## 2023-02-22 PROCEDURE — 83861 MICROFLUID ANALY TEARS: CPT | Performed by: SPECIALIST

## 2023-02-22 PROCEDURE — 99213 OFFICE O/P EST LOW 20 MIN: CPT | Performed by: SPECIALIST

## 2023-02-22 ASSESSMENT — VISUAL ACUITY
OD_BCVA: 20/25
OS_BCVA: 20/25

## 2023-02-22 ASSESSMENT — TEAR BREAK UP TIME (TBUT)
OS_TBUT: 2+
OD_TBUT: 1+

## 2023-02-22 ASSESSMENT — CONFRONTATIONAL VISUAL FIELD TEST (CVF)
OS_FINDINGS: FULL
OD_FINDINGS: FULL

## 2023-02-22 ASSESSMENT — LID EXAM ASSESSMENTS
OS_BLEPHARITIS: LLL LUL 1+ 2+
OD_BLEPHARITIS: RLL RUL 2+

## 2023-08-09 ENCOUNTER — OFFICE (OUTPATIENT)
Dept: URBAN - METROPOLITAN AREA CLINIC 114 | Facility: CLINIC | Age: 78
Setting detail: OPHTHALMOLOGY
End: 2023-08-09
Payer: MEDICARE

## 2023-08-09 DIAGNOSIS — H35.033: ICD-10-CM

## 2023-08-09 DIAGNOSIS — I63.50: ICD-10-CM

## 2023-08-09 DIAGNOSIS — H43.813: ICD-10-CM

## 2023-08-09 DIAGNOSIS — E11.9: ICD-10-CM

## 2023-08-09 DIAGNOSIS — D31.41: ICD-10-CM

## 2023-08-09 DIAGNOSIS — H04.121: ICD-10-CM

## 2023-08-09 DIAGNOSIS — H01.002: ICD-10-CM

## 2023-08-09 DIAGNOSIS — H04.122: ICD-10-CM

## 2023-08-09 DIAGNOSIS — H25.13: ICD-10-CM

## 2023-08-09 DIAGNOSIS — H01.001: ICD-10-CM

## 2023-08-09 DIAGNOSIS — H01.005: ICD-10-CM

## 2023-08-09 DIAGNOSIS — H01.004: ICD-10-CM

## 2023-08-09 PROCEDURE — 83861 MICROFLUID ANALY TEARS: CPT | Performed by: SPECIALIST

## 2023-08-09 PROCEDURE — 99213 OFFICE O/P EST LOW 20 MIN: CPT | Performed by: SPECIALIST

## 2023-08-09 ASSESSMENT — TEAR BREAK UP TIME (TBUT)
OD_TBUT: 1+
OS_TBUT: 2+

## 2023-08-09 ASSESSMENT — TONOMETRY
OS_IOP_MMHG: 16
OD_IOP_MMHG: 16

## 2023-08-09 ASSESSMENT — LID EXAM ASSESSMENTS
OD_BLEPHARITIS: RLL RUL 2+
OS_BLEPHARITIS: LLL LUL 1+ 2+

## 2023-08-09 ASSESSMENT — CONFRONTATIONAL VISUAL FIELD TEST (CVF)
OD_FINDINGS: FULL
OS_FINDINGS: FULL

## 2023-08-09 ASSESSMENT — VISUAL ACUITY
OS_BCVA: 20/25
OD_BCVA: 20/25

## 2023-10-09 ENCOUNTER — APPOINTMENT (OUTPATIENT)
Dept: PULMONOLOGY | Facility: CLINIC | Age: 78
End: 2023-10-09
Payer: MEDICARE

## 2023-10-09 VITALS
DIASTOLIC BLOOD PRESSURE: 74 MMHG | BODY MASS INDEX: 43.05 KG/M2 | WEIGHT: 228 LBS | SYSTOLIC BLOOD PRESSURE: 117 MMHG | HEART RATE: 82 BPM | OXYGEN SATURATION: 98 % | RESPIRATION RATE: 16 BRPM | HEIGHT: 61 IN

## 2023-10-09 PROCEDURE — 99214 OFFICE O/P EST MOD 30 MIN: CPT

## 2023-10-09 RX ORDER — VALACYCLOVIR 1 G/1
1 TABLET, FILM COATED ORAL
Qty: 21 | Refills: 0 | Status: DISCONTINUED | COMMUNITY
Start: 2022-02-07 | End: 2023-10-09

## 2023-10-30 ENCOUNTER — APPOINTMENT (OUTPATIENT)
Dept: PULMONOLOGY | Facility: CLINIC | Age: 78
End: 2023-10-30
Payer: MEDICARE

## 2023-10-30 VITALS
BODY MASS INDEX: 43.46 KG/M2 | WEIGHT: 230 LBS | HEART RATE: 85 BPM | SYSTOLIC BLOOD PRESSURE: 130 MMHG | DIASTOLIC BLOOD PRESSURE: 60 MMHG | RESPIRATION RATE: 16 BRPM | OXYGEN SATURATION: 98 %

## 2023-10-30 DIAGNOSIS — G47.33 OBSTRUCTIVE SLEEP APNEA (ADULT) (PEDIATRIC): ICD-10-CM

## 2023-10-30 DIAGNOSIS — R06.02 SHORTNESS OF BREATH: ICD-10-CM

## 2023-10-30 PROCEDURE — 99213 OFFICE O/P EST LOW 20 MIN: CPT

## 2023-11-29 ENCOUNTER — TRANSCRIPTION ENCOUNTER (OUTPATIENT)
Age: 78
End: 2023-11-29

## 2023-11-29 ENCOUNTER — OUTPATIENT (OUTPATIENT)
Dept: OUTPATIENT SERVICES | Facility: HOSPITAL | Age: 78
LOS: 1 days | End: 2023-11-29
Payer: MEDICARE

## 2023-11-29 VITALS
HEART RATE: 78 BPM | SYSTOLIC BLOOD PRESSURE: 131 MMHG | OXYGEN SATURATION: 98 % | RESPIRATION RATE: 18 BRPM | DIASTOLIC BLOOD PRESSURE: 64 MMHG

## 2023-11-29 VITALS
HEART RATE: 73 BPM | DIASTOLIC BLOOD PRESSURE: 65 MMHG | TEMPERATURE: 98 F | OXYGEN SATURATION: 96 % | HEIGHT: 62 IN | SYSTOLIC BLOOD PRESSURE: 147 MMHG | RESPIRATION RATE: 18 BRPM | WEIGHT: 227.96 LBS

## 2023-11-29 DIAGNOSIS — R06.02 SHORTNESS OF BREATH: ICD-10-CM

## 2023-11-29 LAB
ANION GAP SERPL CALC-SCNC: 14 MMOL/L — SIGNIFICANT CHANGE UP (ref 5–17)
ANION GAP SERPL CALC-SCNC: 14 MMOL/L — SIGNIFICANT CHANGE UP (ref 5–17)
BASOPHILS # BLD AUTO: 0.04 K/UL — SIGNIFICANT CHANGE UP (ref 0–0.2)
BASOPHILS # BLD AUTO: 0.04 K/UL — SIGNIFICANT CHANGE UP (ref 0–0.2)
BASOPHILS NFR BLD AUTO: 0.5 % — SIGNIFICANT CHANGE UP (ref 0–2)
BASOPHILS NFR BLD AUTO: 0.5 % — SIGNIFICANT CHANGE UP (ref 0–2)
BUN SERPL-MCNC: 18.9 MG/DL — SIGNIFICANT CHANGE UP (ref 8–20)
BUN SERPL-MCNC: 18.9 MG/DL — SIGNIFICANT CHANGE UP (ref 8–20)
CALCIUM SERPL-MCNC: 9 MG/DL — SIGNIFICANT CHANGE UP (ref 8.4–10.5)
CALCIUM SERPL-MCNC: 9 MG/DL — SIGNIFICANT CHANGE UP (ref 8.4–10.5)
CHLORIDE SERPL-SCNC: 99 MMOL/L — SIGNIFICANT CHANGE UP (ref 96–108)
CHLORIDE SERPL-SCNC: 99 MMOL/L — SIGNIFICANT CHANGE UP (ref 96–108)
CO2 SERPL-SCNC: 27 MMOL/L — SIGNIFICANT CHANGE UP (ref 22–29)
CO2 SERPL-SCNC: 27 MMOL/L — SIGNIFICANT CHANGE UP (ref 22–29)
CREAT SERPL-MCNC: 0.64 MG/DL — SIGNIFICANT CHANGE UP (ref 0.5–1.3)
CREAT SERPL-MCNC: 0.64 MG/DL — SIGNIFICANT CHANGE UP (ref 0.5–1.3)
EGFR: 91 ML/MIN/1.73M2 — SIGNIFICANT CHANGE UP
EGFR: 91 ML/MIN/1.73M2 — SIGNIFICANT CHANGE UP
EOSINOPHIL # BLD AUTO: 0.09 K/UL — SIGNIFICANT CHANGE UP (ref 0–0.5)
EOSINOPHIL # BLD AUTO: 0.09 K/UL — SIGNIFICANT CHANGE UP (ref 0–0.5)
EOSINOPHIL NFR BLD AUTO: 1.1 % — SIGNIFICANT CHANGE UP (ref 0–6)
EOSINOPHIL NFR BLD AUTO: 1.1 % — SIGNIFICANT CHANGE UP (ref 0–6)
GLUCOSE SERPL-MCNC: 88 MG/DL — SIGNIFICANT CHANGE UP (ref 70–99)
GLUCOSE SERPL-MCNC: 88 MG/DL — SIGNIFICANT CHANGE UP (ref 70–99)
HCT VFR BLD CALC: 38.9 % — SIGNIFICANT CHANGE UP (ref 34.5–45)
HCT VFR BLD CALC: 38.9 % — SIGNIFICANT CHANGE UP (ref 34.5–45)
HGB BLD-MCNC: 13.2 G/DL — SIGNIFICANT CHANGE UP (ref 11.5–15.5)
HGB BLD-MCNC: 13.2 G/DL — SIGNIFICANT CHANGE UP (ref 11.5–15.5)
IMM GRANULOCYTES NFR BLD AUTO: 0.1 % — SIGNIFICANT CHANGE UP (ref 0–0.9)
IMM GRANULOCYTES NFR BLD AUTO: 0.1 % — SIGNIFICANT CHANGE UP (ref 0–0.9)
LYMPHOCYTES # BLD AUTO: 3.11 K/UL — SIGNIFICANT CHANGE UP (ref 1–3.3)
LYMPHOCYTES # BLD AUTO: 3.11 K/UL — SIGNIFICANT CHANGE UP (ref 1–3.3)
LYMPHOCYTES # BLD AUTO: 36.6 % — SIGNIFICANT CHANGE UP (ref 13–44)
LYMPHOCYTES # BLD AUTO: 36.6 % — SIGNIFICANT CHANGE UP (ref 13–44)
MAGNESIUM SERPL-MCNC: 1.9 MG/DL — SIGNIFICANT CHANGE UP (ref 1.6–2.6)
MAGNESIUM SERPL-MCNC: 1.9 MG/DL — SIGNIFICANT CHANGE UP (ref 1.6–2.6)
MCHC RBC-ENTMCNC: 31.2 PG — SIGNIFICANT CHANGE UP (ref 27–34)
MCHC RBC-ENTMCNC: 31.2 PG — SIGNIFICANT CHANGE UP (ref 27–34)
MCHC RBC-ENTMCNC: 33.9 GM/DL — SIGNIFICANT CHANGE UP (ref 32–36)
MCHC RBC-ENTMCNC: 33.9 GM/DL — SIGNIFICANT CHANGE UP (ref 32–36)
MCV RBC AUTO: 92 FL — SIGNIFICANT CHANGE UP (ref 80–100)
MCV RBC AUTO: 92 FL — SIGNIFICANT CHANGE UP (ref 80–100)
MONOCYTES # BLD AUTO: 0.64 K/UL — SIGNIFICANT CHANGE UP (ref 0–0.9)
MONOCYTES # BLD AUTO: 0.64 K/UL — SIGNIFICANT CHANGE UP (ref 0–0.9)
MONOCYTES NFR BLD AUTO: 7.5 % — SIGNIFICANT CHANGE UP (ref 2–14)
MONOCYTES NFR BLD AUTO: 7.5 % — SIGNIFICANT CHANGE UP (ref 2–14)
NEUTROPHILS # BLD AUTO: 4.61 K/UL — SIGNIFICANT CHANGE UP (ref 1.8–7.4)
NEUTROPHILS # BLD AUTO: 4.61 K/UL — SIGNIFICANT CHANGE UP (ref 1.8–7.4)
NEUTROPHILS NFR BLD AUTO: 54.2 % — SIGNIFICANT CHANGE UP (ref 43–77)
NEUTROPHILS NFR BLD AUTO: 54.2 % — SIGNIFICANT CHANGE UP (ref 43–77)
PLATELET # BLD AUTO: 259 K/UL — SIGNIFICANT CHANGE UP (ref 150–400)
PLATELET # BLD AUTO: 259 K/UL — SIGNIFICANT CHANGE UP (ref 150–400)
POTASSIUM SERPL-MCNC: 3.8 MMOL/L — SIGNIFICANT CHANGE UP (ref 3.5–5.3)
POTASSIUM SERPL-MCNC: 3.8 MMOL/L — SIGNIFICANT CHANGE UP (ref 3.5–5.3)
POTASSIUM SERPL-SCNC: 3.8 MMOL/L — SIGNIFICANT CHANGE UP (ref 3.5–5.3)
POTASSIUM SERPL-SCNC: 3.8 MMOL/L — SIGNIFICANT CHANGE UP (ref 3.5–5.3)
RBC # BLD: 4.23 M/UL — SIGNIFICANT CHANGE UP (ref 3.8–5.2)
RBC # BLD: 4.23 M/UL — SIGNIFICANT CHANGE UP (ref 3.8–5.2)
RBC # FLD: 12.8 % — SIGNIFICANT CHANGE UP (ref 10.3–14.5)
RBC # FLD: 12.8 % — SIGNIFICANT CHANGE UP (ref 10.3–14.5)
SODIUM SERPL-SCNC: 140 MMOL/L — SIGNIFICANT CHANGE UP (ref 135–145)
SODIUM SERPL-SCNC: 140 MMOL/L — SIGNIFICANT CHANGE UP (ref 135–145)
WBC # BLD: 8.5 K/UL — SIGNIFICANT CHANGE UP (ref 3.8–10.5)
WBC # BLD: 8.5 K/UL — SIGNIFICANT CHANGE UP (ref 3.8–10.5)
WBC # FLD AUTO: 8.5 K/UL — SIGNIFICANT CHANGE UP (ref 3.8–10.5)
WBC # FLD AUTO: 8.5 K/UL — SIGNIFICANT CHANGE UP (ref 3.8–10.5)

## 2023-11-29 PROCEDURE — 93312 ECHO TRANSESOPHAGEAL: CPT

## 2023-11-29 PROCEDURE — 83735 ASSAY OF MAGNESIUM: CPT

## 2023-11-29 PROCEDURE — 80048 BASIC METABOLIC PNL TOTAL CA: CPT

## 2023-11-29 PROCEDURE — 93010 ELECTROCARDIOGRAM REPORT: CPT

## 2023-11-29 PROCEDURE — 36415 COLL VENOUS BLD VENIPUNCTURE: CPT

## 2023-11-29 PROCEDURE — 93005 ELECTROCARDIOGRAM TRACING: CPT

## 2023-11-29 PROCEDURE — 93320 DOPPLER ECHO COMPLETE: CPT

## 2023-11-29 PROCEDURE — 93325 DOPPLER ECHO COLOR FLOW MAPG: CPT

## 2023-11-29 PROCEDURE — 85025 COMPLETE CBC W/AUTO DIFF WBC: CPT

## 2023-11-29 RX ORDER — SEMAGLUTIDE 0.68 MG/ML
0.5 INJECTION, SOLUTION SUBCUTANEOUS
Refills: 0 | DISCHARGE

## 2023-11-29 RX ORDER — CHLORHEXIDINE GLUCONATE 213 G/1000ML
1 SOLUTION TOPICAL ONCE
Refills: 0 | Status: DISCONTINUED | OUTPATIENT
Start: 2023-11-29 | End: 2023-12-14

## 2023-11-29 RX ORDER — ASPIRIN/CALCIUM CARB/MAGNESIUM 324 MG
1 TABLET ORAL
Refills: 0 | DISCHARGE

## 2023-11-29 RX ORDER — ALLOPURINOL 300 MG
0 TABLET ORAL
Refills: 0 | DISCHARGE

## 2023-11-29 RX ORDER — ICOSAPENT ETHYL 500 MG/1
2 CAPSULE, LIQUID FILLED ORAL
Refills: 0 | DISCHARGE

## 2023-11-29 RX ORDER — ESOMEPRAZOLE MAGNESIUM 40 MG/1
1 CAPSULE, DELAYED RELEASE ORAL
Refills: 0 | DISCHARGE

## 2023-11-29 RX ORDER — LOSARTAN POTASSIUM 100 MG/1
1 TABLET, FILM COATED ORAL
Refills: 0 | DISCHARGE

## 2023-11-29 RX ORDER — METOPROLOL TARTRATE 50 MG
1 TABLET ORAL
Refills: 0 | DISCHARGE

## 2023-11-29 RX ORDER — TICAGRELOR 90 MG/1
1 TABLET ORAL
Refills: 0 | DISCHARGE

## 2023-11-29 NOTE — DISCHARGE NOTE PROVIDER - NSDCMRMEDTOKEN_GEN_ALL_CORE_FT
acetaminophen 325 mg oral tablet: 2 tab(s) orally every 6 hours, As needed, For Temp greater than 38 C (100.4 F)  acetaminophen 325 mg oral tablet: 2 tab(s) orally every 6 hours, As needed, Mild Pain (1 - 3)  allopurinol 100 mg oral tablet: 1 tab(s) orally once a day  ascorbic acid 500 mg oral tablet: 1 tab(s) orally once a day  aspirin 81 mg oral tablet, chewable: 1 tab(s) chewed once a day  Brilinta (ticagrelor) 90 mg oral tablet: 1 tab(s) orally 2 times a day  cholecalciferol 1000 intl units oral capsule: 1 cap(s) orally once a day  fluticasone 50 mcg/inh nasal spray: 1 spray(s) nasal 2 times a day  folic acid 1 mg oral tablet: 1 tab(s) orally once a day  furosemide 20 mg oral tablet: 1 tab(s) orally once a day  losartan 25 mg oral tablet: 1 tab(s) orally 2 times a day  magnesium oxide 400 mg (241.3 mg elemental magnesium) oral tablet: 1 tab(s) orally once a day  metoprolol succinate 50 mg oral capsule, extended release: 1 cap(s) orally once a day  Multiple Vitamins oral tablet: 1 tab(s) orally once a day  NexIUM 20 mg oral delayed release capsule: 1 cap(s) orally 2 times a day  Ozempic 2 mg/1.5 mL (0.25 mg or 0.5 mg dose) subcutaneous solution: 0.5 milligram(s) subcutaneously  potassium chloride 10 mEq oral tablet, extended release: 1 tab(s) orally once a day  pregabalin 50 mg oral capsule: 1 cap(s) orally 2 times a day  prochlorperazine 5 mg oral tablet: 1-2 tab(s) orally 3 times a day, As Needed  senna oral tablet: 2 tab(s) orally once a day (at bedtime)  Vascepa 1 g oral capsule: 2 cap(s) orally 2 times a day

## 2023-11-29 NOTE — DISCHARGE NOTE PROVIDER - NSDCCPTREATMENT_GEN_ALL_CORE_FT
PRINCIPAL PROCEDURE  Procedure: US echo transesophageal  Findings and Treatment: Do not drive or operate machinery today as you have received sedation. You should take it easy today and take your time, especially when changing positions. Follow up with Dr. Hill to further discuss the results of the LEANDRO and any further evaluations and recommendations going forward.

## 2023-11-29 NOTE — DISCHARGE NOTE PROVIDER - CARE PROVIDER_API CALL
Sangeeta Hill  Cardiovascular Disease  [address] [place] [id number]  Phone: [phone]  Fax: [phone]  Follow Up Time:

## 2023-11-29 NOTE — H&P PST ADULT - ASSESSMENT
ASSESSMENT: 76yo female followed by Dr Hill and endorsing TRAN, heart "fluttering" and fatigue, recent TTE 9/2023 with mod AS and preserved LVEF, now presenting for LEANDRO to better assess AS severity and morphology.    -LEANDRO as ordered  -Labs and ECG reviewed  -Procedure discussed with patient; risks and benefits explained; questions answered  -Consent obtained by Echocardiographer and anesthesiologist

## 2023-11-29 NOTE — PROGRESS NOTE ADULT - SUBJECTIVE AND OBJECTIVE BOX
[place] [facility]                                   [address] [place] [address]                                                      PHONE: [phone]                                                         FAX: [phone]  [email or web address]  ---------------------------------------------------------------------------------------------------------------------------------    Overnight events/patient complaints: here for LEANDRO to assess AS      Biaxin (Rash)  penicillin (Unknown)  niacin (Unknown)  Bextra (Rash)  tramadol (Nausea)  Norvasc (Rash)  predniSONE (Other)  Flagyl (Anaphylaxis)    MEDICATIONS  (STANDING):  chlorhexidine 4% Liquid 1 Application(s) Topical once    MEDICATIONS  (PRN):      Vital Signs Last 24 Hrs  T(C): --  T(F): --  HR: --  BP: --  BP(mean): --  RR: --  SpO2: --      Daily     Daily   ICU Vital Signs Last 24 Hrs  BRYANT ABARCA  I&O's Detail    I&O's Summary    Drug Dosing Weight  BRYANT ABARCA      PHYSICAL EXAM:  General: Appears alert and cooperative.  HEENT: Head; normocephalic, atraumatic.  Eyes: Pupils reactive, cornea wnl.  Neck: Supple, no nodes adenopathy, no NVD or carotid bruit or thyromegaly.  CARDIOVASCULAR: Normal S1 decreased S2 3/6 ssytolic murmur at base  LUNGS: No rales, rhonchi or wheeze. Normal breath sounds bilaterally.  ABDOMEN: Soft, nontender without mass or organomegaly. bowel sounds normoactive.  EXTREMITIES: No clubbing, cyanosis or edema. Distal pulses wnl.   SKIN: warm and dry with normal turgor.  NEURO: Alert/oriented x 3/normal motor exam. No pathologic reflexes.    PSYCH: normal affect.        LABS:                        13.2   8.50  )-----------( 259      ( 29 Nov 2023 13:19 )             38.9     11-29    140  |  99  |  18.9  ----------------------------<  88  3.8   |  27.0  |  0.64    Ca    9.0      29 Nov 2023 13:19  Mg     1.9     11-29      LEANDRO performed with anesthesia standby  Pt tolerated well  Results:  Mild conc LVH EF 65%  Abnormal relaxation  Moderate AS SLAVA 1.1 cm2 DOI 0.35 peak gradient 41 mm Hg, mild AI  Mild MAC with trace MR  Normal right heart

## 2023-11-29 NOTE — DISCHARGE NOTE PROVIDER - NSDCFUADDINST_GEN_ALL_CORE_FT
Do not drive or operate machinery today as you have received sedation. You should take it easy today and take your time, especially when changing positions. Follow up with Dr. Hill to further discuss the results of the LEANDRO and any further evaluations and recommendations going forward.

## 2023-11-29 NOTE — H&P PST ADULT - HISTORY OF PRESENT ILLNESS
Department of Cardiology                                                                  [facility]/[place] [address] [address] [facility]                                                            Telephone: [phone]. Fax:[phone]                                                                                     Pre-LEANDRO Note        Narrative:  76yo female with h/o HTN, HLD (statin intolerant), CVA, CAD with previous PCIs to LMCA and LAD in 2020, subsequent PCI to OM3 9/2023 on DAPT with ASA/Brilinta, followed by Dr Hill, endorsing TRAN and fatigue, also experiences intermittent fluttering sensation in the chest, TTE 9/20/23 with LVEF 70%, mod AS with P/MG 43/26mmHg, SLAVA 1.42cm2, now presenting for LEANDRO to better assess AS morphology and severity.      ASA and Mallampati: Per Anesthesia    	  Home Medications:  acetaminophen 325 mg oral tablet: 2 tab(s) orally every 6 hours, As needed, For Temp greater than 38 C (100.4 F) (13 Apr 2017 08:15)  acetaminophen 325 mg oral tablet: 2 tab(s) orally every 6 hours, As needed, Mild Pain (1 - 3) (13 Apr 2017 08:15)  ascorbic acid 500 mg oral tablet: 1 tab(s) orally once a day (13 Apr 2017 08:15)  Multiple Vitamins oral tablet: 1 tab(s) orally once a day (13 Apr 2017 08:15)  pregabalin 50 mg oral capsule: 1 cap(s) orally 2 times a day (13 Apr 2017 08:15)  sertraline 25 mg oral tablet: 1 tab(s) orally once a day (13 Apr 2017 08:15)    PHYSICAL EXAM:    Constitutional: A & O x 3  HEENT:   Normal oral mucosa, PERRL, EOMI	  Cardiovascular: Normal S1 S2, No JVD, No murmurs, No edema  Respiratory: Lungs clear to auscultation	  Gastrointestinal:  Soft, Non-tender, + BS	  Skin: No rashes, No ecchymoses, No cyanosis  Neurologic: Non-focal  Extremities: Normal range of motion, No clubbing, cyanosis or edema  Vascular: Peripheral pulses palpable 2+ bilaterally      ECG:  	    TTE 9/20/23:  LVEF   LABS:	 	    CARDIAC MARKERS:                    proBNP:   Lipid Profile:   HgA1c:   TSH:     ASSESSMENT:    -LEANDRO as ordered  -Labs and ECG reviewed  -Procedure discussed with patient; risks and benefits explained; questions answered  -Consent obtained by Echocardiographer and anesthesiologist                                                                         Department of Cardiology                                                                  [facility]/[place] [address] [address] [facility]                                                            Telephone: [phone]. Fax:[phone]                                                                                     Pre-LEANDRO Note        Narrative:  78yo female with h/o HTN, HLD (statin intolerant), CVA, CAD with previous PCIs to LMCA and LAD in 2020, subsequent PCI to OM3 9/2023 on DAPT with ASA/Brilinta, followed by Dr Hill, endorsing TRAN and fatigue, also experiences intermittent fluttering sensation in the chest, TTE 9/20/23 with LVEF 70%, mod AS with P/MG 43/26mmHg, SLAVA 1.42cm2, now presenting for LEANDRO to better assess AS morphology and severity.      ASA and Mallampati: Per Anesthesia    	  Home Medications:  acetaminophen 325 mg oral tablet: 2 tab(s) orally every 6 hours, As needed, For Temp greater than 38 C (100.4 F) (13 Apr 2017 08:15)  acetaminophen 325 mg oral tablet: 2 tab(s) orally every 6 hours, As needed, Mild Pain (1 - 3) (13 Apr 2017 08:15)  ascorbic acid 500 mg oral tablet: 1 tab(s) orally once a day (13 Apr 2017 08:15)  Multiple Vitamins oral tablet: 1 tab(s) orally once a day (13 Apr 2017 08:15)  pregabalin 50 mg oral capsule: 1 cap(s) orally 2 times a day (13 Apr 2017 08:15)  sertraline 25 mg oral tablet: 1 tab(s) orally once a day (13 Apr 2017 08:15)    PHYSICAL EXAM:    Constitutional: A & O x 3  HEENT:   Normal oral mucosa, PERRL, EOMI	  Cardiovascular: Normal S1 S2, No JVD, II/VI systolic murmur, No edema  Respiratory: Lungs clear to auscultation	  Gastrointestinal:  Soft, Non-tender, + BS	  Skin: No rashes, No ecchymoses, No cyanosis  Neurologic: Non-focal  Extremities: Normal range of motion, No clubbing, cyanosis or edema  Vascular: Peripheral pulses palpable 2+ bilaterally      TTE 9/20/23:  LVEF 70%  mod AS with P/MG 43/26mmHg, SLAVA 1.42cm2    LABS:	 	  11-29    140  |  99  |  18.9  ----------------------------<  88  3.8   |  27.0  |  0.64    Ca    9.0      29 Nov 2023 13:19  Mg     1.9     11-29                            13.2   8.50  )-----------( 259      ( 29 Nov 2023 13:19 )             38.9

## 2023-11-29 NOTE — PROGRESS NOTE ADULT - ASSESSMENT
Assessment  Moderate not severe AS, mild AI  Trace MR'  LVH with normal EF diastolic dysfunction  CAD s/p prior PCI  HTN  HLD      Rec  cont med therapy  FU office 2-3 mos

## 2023-11-29 NOTE — DISCHARGE NOTE NURSING/CASE MANAGEMENT/SOCIAL WORK - PATIENT PORTAL LINK FT
You can access the FollowMyHealth Patient Portal offered by [facility] by registering at the following website: [email or web address]. By joining [facility]’s FollowMyHealth portal, you will also be able to view your health information using other applications (apps) compatible with our system.

## 2023-11-29 NOTE — DISCHARGE NOTE PROVIDER - HOSPITAL COURSE
Narrative:  76yo female with h/o HTN, HLD (statin intolerant), CVA, CAD with previous PCIs to LMCA and LAD in 2020, subsequent PCI to OM3 9/2023 on DAPT with ASA/Brilinta, followed by Dr Hill, endorsing TRAN and fatigue, also experiences intermittent fluttering sensation in the chest, TTE 9/20/23 with LVEF 70%, mod AS with P/MG 43/26mmHg, SLAVA 1.42cm2, now presenting for LEANDRO to better assess AS morphology and severity.   Now s/p LHC via *** with *** procedure performed by  ***, received *** and IV sedation intraprocedurally, arrived to recovery in NAD and HDS, post PCI ECG with no acute changes, *** access site stable, no bleed/hematoma, distal pulse +,     Plan:  -Formal cath report pending  -Post procedure management/monitoring per protocol  -Access site precautions  -Radial compression band removal at ***  -Bedrest x ***hours post procedure  -Labs and EKG in am  -Repeat ECG if any clinical indication or change on tele  -NS 250mL bolus post cath ******  -Continue current medical therapy  -Dual anti platelet therapy with aspirin/plavix **  -Cont BB with Toprol 50mg po daily **  -Cont statin therapy with Lipitor 10mg po qHS **  -Educated regarding strict adherence with DAPT   -Educated regarding post procedure management and care  -Discussed the importance of RF modification  -Cardiac rehab info provided/referral and communication to cardiac rehab completed  -Follow-up appt with interventional team 3-5 days post PCI***  -Follow-up outpt in 1-2 weeks with Cardiologist  ***  -DISPO:   -Plan for D/C in am if remains HDS, ECG and labs in am stable and without complications  -Plan for D/C home after post procedure recovery completed.        Narrative:  76yo female with h/o HTN, HLD (statin intolerant), CVA, CAD with previous PCIs to LMCA and LAD in 2020, subsequent PCI to OM3 9/2023 on DAPT with ASA/Brilinta, followed by Dr Hill, endorsing TRAN and fatigue, also experiences intermittent fluttering sensation in the chest, TTE 9/20/23 with LVEF 70%, mod AS with P/MG 43/26mmHg, SLAVA 1.42cm2, now presenting for LEANDRO to better assess AS morphology and severity.     Now s/p LEANDRO with gargle, tolerated well, moderate AS, arrived to recovery in NAD and HDS, plan for discharge home after recovery    -Formal LEANDRO report   -Post LEANDRO management/monitoring per protocol  -Maintain NPO until 1610  -D/C home after recovery      PHYSICIAN INTERPRETATION:  Left Ventricle: The left ventricular internal cavity size is normal. Left   ventricular wall thickness is mildly increased.  Left ventricular ejection fraction, by visual estimation, is 60 to 65%.   Spectral Doppler shows impaired relaxation pattern of left ventricular   myocardial filling (Grade I diastolic dysfunction). Elevated mean left   atrial pressure.  Right Ventricle: The right ventricular size is normal. RV systolic   function is normal.  Left Atrium: Mildly enlarged left atrium. No left atrial appendage   thrombus is seen and normal left atrial appendage velocities. No evidence   of LA or LESTER thrombus or SEC. Lipomatous hypertrophy of the intra-atrial   septum. Color flow doppler and intravenous injection of agitated saline   demonstrates the presence of an intact intra atrial septum.  Right Atrium: Normal right atrial size.  Pericardium: There is no evidence of pericardial effusion.  Mitral Valve: The mitral valve is degenerative in appearance. Mitral   leaflet mobility is normal. There is mild mitral annular calcification.   Trace mitral valve regurgitation is seen.  Tricuspid Valve: The tricuspid valve is normal in structure. Moderate   tricuspid regurgitation is visualized.  Aortic Valve: Trilealfet AV with calcifications predominantly at the   commisures. SLAVA via planimetry 1.1 cm2, peak gradient 41 mm Hg, mean   gradient 19 mm Hg, DOI 0.35 c/w moderate not severe AS. Mild AI on color   flow.  Pulmonic Valve: The pulmonic valve is normal. Trace pulmonic valve   regurgitation.  Aorta: The aortic root, ascending aorta and aortic arch are all   structurally normal, with no evidence of dilitation or obstruction.   Simple atheroma seen in the aortic arch and descending aorta.  Venous: All four pulmonary veins are normal.  Shunts: There is no evidence of a patent foramen ovale.      Summary:   1. Left ventricular ejection fraction, by visual estimation, is 60 to   65%.   2. Elevated mean left atrial pressure.   3. Mildly increased LV wall thickness.   4. Normal left ventricular internal cavity size.   5. Spectral Doppler shows impaired relaxation pattern of left   ventricular myocardial filling (Grade I diastolic dysfunction).   6. There is mild concentric left ventricular hypertrophy.   7. No evidence of LA or LESTER thrombus or SEC.   8. Mildly enlarged left atrium.   9. Normal right atrial size.  10. Degenerative mitral valve.  11. Mild mitral annular calcification.  12. Trace mitral valve regurgitation.  13. Moderate tricuspid regurgitation.  14. Trilealfet AV with calcifications predominantly at the commisures.   SLAVA via planimetry 1.1 cm2, peak gradient 41 mm Hg, mean gradient 19 mm   Hg, DOI 0.35 c/w moderate not severe AS. Mild AI on color flow.  15. Color flow doppler and intravenous injection of agitated saline   demonstrates the presence of an intact intra atrial septum.  16. No left atrial appendage thrombus and normal left atrial appendage   velocities.

## 2024-02-07 ENCOUNTER — OFFICE (OUTPATIENT)
Dept: URBAN - METROPOLITAN AREA CLINIC 114 | Facility: CLINIC | Age: 79
Setting detail: OPHTHALMOLOGY
End: 2024-02-07
Payer: MEDICARE

## 2024-02-07 DIAGNOSIS — H01.005: ICD-10-CM

## 2024-02-07 DIAGNOSIS — H01.002: ICD-10-CM

## 2024-02-07 DIAGNOSIS — H04.122: ICD-10-CM

## 2024-02-07 DIAGNOSIS — H04.121: ICD-10-CM

## 2024-02-07 DIAGNOSIS — H01.004: ICD-10-CM

## 2024-02-07 DIAGNOSIS — H01.001: ICD-10-CM

## 2024-02-07 PROCEDURE — 83861 MICROFLUID ANALY TEARS: CPT | Mod: QW,RT | Performed by: SPECIALIST

## 2024-02-07 PROCEDURE — 92014 COMPRE OPH EXAM EST PT 1/>: CPT | Performed by: SPECIALIST

## 2024-02-07 PROCEDURE — 83861 MICROFLUID ANALY TEARS: CPT | Mod: QW,LT | Performed by: SPECIALIST

## 2024-02-07 ASSESSMENT — REFRACTION_AUTOREFRACTION
OD_CYLINDER: -1.00
OD_AXIS: 97
OS_SPHERE: +1.00
OD_SPHERE: 0.00

## 2024-02-07 ASSESSMENT — REFRACTION_MANIFEST
OD_VA1: 20/20
OD_AXIS: 90
OD_CYLINDER: -1.00
OD_SPHERE: PLANO

## 2024-02-07 ASSESSMENT — CONFRONTATIONAL VISUAL FIELD TEST (CVF)
OS_FINDINGS: FULL
OD_FINDINGS: FULL

## 2024-02-07 ASSESSMENT — SPHEQUIV_DERIVED: OD_SPHEQUIV: -0.5

## 2024-02-07 ASSESSMENT — LID EXAM ASSESSMENTS
OD_BLEPHARITIS: RLL RUL 2+
OS_BLEPHARITIS: LLL LUL 1+ 2+

## 2024-02-07 ASSESSMENT — TEAR BREAK UP TIME (TBUT)
OS_TBUT: 1+
OD_TBUT: 1+

## 2024-08-14 ENCOUNTER — OFFICE (OUTPATIENT)
Dept: URBAN - METROPOLITAN AREA CLINIC 114 | Facility: CLINIC | Age: 79
Setting detail: OPHTHALMOLOGY
End: 2024-08-14
Payer: MEDICARE

## 2024-08-14 DIAGNOSIS — H04.122: ICD-10-CM

## 2024-08-14 DIAGNOSIS — H01.001: ICD-10-CM

## 2024-08-14 DIAGNOSIS — H01.005: ICD-10-CM

## 2024-08-14 DIAGNOSIS — H04.121: ICD-10-CM

## 2024-08-14 DIAGNOSIS — H01.002: ICD-10-CM

## 2024-08-14 DIAGNOSIS — H01.004: ICD-10-CM

## 2024-08-14 PROCEDURE — 83861 MICROFLUID ANALY TEARS: CPT | Mod: QW,RT | Performed by: SPECIALIST

## 2024-08-14 PROCEDURE — 83861 MICROFLUID ANALY TEARS: CPT | Mod: QW,LT | Performed by: SPECIALIST

## 2024-08-14 PROCEDURE — 99213 OFFICE O/P EST LOW 20 MIN: CPT | Performed by: SPECIALIST

## 2024-08-14 ASSESSMENT — LID EXAM ASSESSMENTS
OS_BLEPHARITIS: LLL LUL 1+ 2+
OD_BLEPHARITIS: RLL RUL 2+

## 2024-08-14 ASSESSMENT — CONFRONTATIONAL VISUAL FIELD TEST (CVF)
OS_FINDINGS: FULL
OD_FINDINGS: FULL

## 2024-11-12 ENCOUNTER — APPOINTMENT (OUTPATIENT)
Dept: PULMONOLOGY | Facility: CLINIC | Age: 79
End: 2024-11-12
Payer: MEDICARE

## 2024-11-12 VITALS
HEIGHT: 61 IN | HEART RATE: 70 BPM | WEIGHT: 230 LBS | RESPIRATION RATE: 16 BRPM | DIASTOLIC BLOOD PRESSURE: 62 MMHG | BODY MASS INDEX: 43.43 KG/M2 | SYSTOLIC BLOOD PRESSURE: 138 MMHG | OXYGEN SATURATION: 97 %

## 2024-11-12 DIAGNOSIS — Z71.89 OTHER SPECIFIED COUNSELING: ICD-10-CM

## 2024-11-12 DIAGNOSIS — G47.33 OBSTRUCTIVE SLEEP APNEA (ADULT) (PEDIATRIC): ICD-10-CM

## 2024-11-12 DIAGNOSIS — J98.4 OTHER DISORDERS OF LUNG: ICD-10-CM

## 2024-11-12 DIAGNOSIS — E66.9 OTHER DISORDERS OF LUNG: ICD-10-CM

## 2024-11-12 PROCEDURE — G2211 COMPLEX E/M VISIT ADD ON: CPT

## 2024-11-12 PROCEDURE — 99215 OFFICE O/P EST HI 40 MIN: CPT

## 2024-12-04 ENCOUNTER — OFFICE (OUTPATIENT)
Dept: URBAN - METROPOLITAN AREA CLINIC 114 | Facility: CLINIC | Age: 79
Setting detail: OPHTHALMOLOGY
End: 2024-12-04
Payer: MEDICARE

## 2024-12-04 DIAGNOSIS — H25.041: ICD-10-CM

## 2024-12-04 DIAGNOSIS — H01.002: ICD-10-CM

## 2024-12-04 DIAGNOSIS — H43.813: ICD-10-CM

## 2024-12-04 DIAGNOSIS — H25.13: ICD-10-CM

## 2024-12-04 DIAGNOSIS — H04.123: ICD-10-CM

## 2024-12-04 DIAGNOSIS — H35.033: ICD-10-CM

## 2024-12-04 DIAGNOSIS — H01.005: ICD-10-CM

## 2024-12-04 DIAGNOSIS — H01.001: ICD-10-CM

## 2024-12-04 DIAGNOSIS — H01.004: ICD-10-CM

## 2024-12-04 DIAGNOSIS — E11.9: ICD-10-CM

## 2024-12-04 DIAGNOSIS — D31.41: ICD-10-CM

## 2024-12-04 DIAGNOSIS — I63.50: ICD-10-CM

## 2024-12-04 PROCEDURE — 99213 OFFICE O/P EST LOW 20 MIN: CPT | Performed by: SPECIALIST

## 2024-12-04 ASSESSMENT — TEAR BREAK UP TIME (TBUT)
OS_TBUT: 2+
OD_TBUT: 2+

## 2024-12-04 ASSESSMENT — VISUAL ACUITY
OS_BCVA: 20/40-
OD_BCVA: 20/20-

## 2024-12-04 ASSESSMENT — CONFRONTATIONAL VISUAL FIELD TEST (CVF)
OD_FINDINGS: FULL
OS_FINDINGS: FULL

## 2024-12-04 ASSESSMENT — LID EXAM ASSESSMENTS
OS_BLEPHARITIS: LLL LUL 1+ 2+
OD_BLEPHARITIS: RLL RUL 2+

## 2024-12-04 ASSESSMENT — TONOMETRY: OS_IOP_MMHG: 15

## 2024-12-05 ENCOUNTER — APPOINTMENT (OUTPATIENT)
Dept: PULMONOLOGY | Facility: CLINIC | Age: 79
End: 2024-12-05

## 2025-01-15 ENCOUNTER — APPOINTMENT (OUTPATIENT)
Dept: PULMONOLOGY | Facility: CLINIC | Age: 80
End: 2025-01-15

## 2025-02-12 ENCOUNTER — OFFICE (OUTPATIENT)
Dept: URBAN - METROPOLITAN AREA CLINIC 114 | Facility: CLINIC | Age: 80
Setting detail: OPHTHALMOLOGY
End: 2025-02-12
Payer: MEDICARE

## 2025-02-12 DIAGNOSIS — H25.13: ICD-10-CM

## 2025-02-12 DIAGNOSIS — H01.001: ICD-10-CM

## 2025-02-12 DIAGNOSIS — H43.813: ICD-10-CM

## 2025-02-12 DIAGNOSIS — E11.9: ICD-10-CM

## 2025-02-12 DIAGNOSIS — H01.004: ICD-10-CM

## 2025-02-12 DIAGNOSIS — H35.033: ICD-10-CM

## 2025-02-12 DIAGNOSIS — H04.122: ICD-10-CM

## 2025-02-12 DIAGNOSIS — I63.50: ICD-10-CM

## 2025-02-12 DIAGNOSIS — H04.121: ICD-10-CM

## 2025-02-12 DIAGNOSIS — D31.41: ICD-10-CM

## 2025-02-12 DIAGNOSIS — H01.002: ICD-10-CM

## 2025-02-12 DIAGNOSIS — H01.005: ICD-10-CM

## 2025-02-12 PROCEDURE — 83861 MICROFLUID ANALY TEARS: CPT | Mod: QW,RT | Performed by: SPECIALIST

## 2025-02-12 PROCEDURE — 83861 MICROFLUID ANALY TEARS: CPT | Mod: QW,LT | Performed by: SPECIALIST

## 2025-02-12 PROCEDURE — 92014 COMPRE OPH EXAM EST PT 1/>: CPT | Performed by: SPECIALIST

## 2025-02-12 ASSESSMENT — TONOMETRY
OD_IOP_MMHG: 17
OS_IOP_MMHG: 17

## 2025-02-12 ASSESSMENT — LID EXAM ASSESSMENTS
OD_BLEPHARITIS: RLL RUL 2+
OS_BLEPHARITIS: LLL LUL 1+ 2+

## 2025-02-12 ASSESSMENT — CONFRONTATIONAL VISUAL FIELD TEST (CVF)
OS_FINDINGS: FULL
OD_FINDINGS: FULL

## 2025-02-12 ASSESSMENT — TEAR BREAK UP TIME (TBUT)
OD_TBUT: 1+
OS_TBUT: 1+

## 2025-02-12 ASSESSMENT — VISUAL ACUITY
OS_BCVA: 20/25
OD_BCVA: 20/20

## 2025-08-27 ENCOUNTER — OFFICE (OUTPATIENT)
Dept: URBAN - METROPOLITAN AREA CLINIC 114 | Facility: CLINIC | Age: 80
Setting detail: OPHTHALMOLOGY
End: 2025-08-27
Payer: MEDICARE

## 2025-08-27 DIAGNOSIS — D31.41: ICD-10-CM

## 2025-08-27 DIAGNOSIS — I63.50: ICD-10-CM

## 2025-08-27 DIAGNOSIS — H04.121: ICD-10-CM

## 2025-08-27 DIAGNOSIS — H04.122: ICD-10-CM

## 2025-08-27 DIAGNOSIS — H01.004: ICD-10-CM

## 2025-08-27 DIAGNOSIS — H35.033: ICD-10-CM

## 2025-08-27 DIAGNOSIS — H43.813: ICD-10-CM

## 2025-08-27 DIAGNOSIS — E11.9: ICD-10-CM

## 2025-08-27 DIAGNOSIS — H01.002: ICD-10-CM

## 2025-08-27 DIAGNOSIS — H01.005: ICD-10-CM

## 2025-08-27 DIAGNOSIS — H01.001: ICD-10-CM

## 2025-08-27 DIAGNOSIS — H25.13: ICD-10-CM

## 2025-08-27 PROCEDURE — 99213 OFFICE O/P EST LOW 20 MIN: CPT | Performed by: SPECIALIST

## 2025-08-27 PROCEDURE — 83861 MICROFLUID ANALY TEARS: CPT | Mod: QW,LT | Performed by: SPECIALIST

## 2025-08-27 PROCEDURE — 83861 MICROFLUID ANALY TEARS: CPT | Mod: QW,RT | Performed by: SPECIALIST

## 2025-08-27 ASSESSMENT — CONFRONTATIONAL VISUAL FIELD TEST (CVF)
OS_FINDINGS: FULL
OD_FINDINGS: FULL

## 2025-08-27 ASSESSMENT — TEAR BREAK UP TIME (TBUT)
OD_TBUT: 1+
OS_TBUT: 1+

## 2025-08-27 ASSESSMENT — LID EXAM ASSESSMENTS
OD_BLEPHARITIS: RLL RUL 2+
OS_BLEPHARITIS: LLL LUL 1+ 2+

## 2025-08-27 ASSESSMENT — KERATOMETRY
OS_K1POWER_DIOPTERS: 45.00
OD_K2POWER_DIOPTERS: 45.25
OD_AXISANGLE_DEGREES: 168
OS_AXISANGLE_DEGREES: 096
OD_K1POWER_DIOPTERS: 44.75
OS_K2POWER_DIOPTERS: 45.50

## 2025-08-27 ASSESSMENT — VISUAL ACUITY
OD_BCVA: 20/20-1
OS_BCVA: 20/40

## 2025-08-27 ASSESSMENT — REFRACTION_AUTOREFRACTION
OD_CYLINDER: -1.75
OS_AXIS: 097
OS_CYLINDER: -0.50
OD_AXIS: 095
OD_SPHERE: +0.25
OS_SPHERE: +1.00

## 2025-08-27 ASSESSMENT — TONOMETRY
OD_IOP_MMHG: 14
OS_IOP_MMHG: 14